# Patient Record
Sex: MALE | Race: BLACK OR AFRICAN AMERICAN | NOT HISPANIC OR LATINO | URBAN - METROPOLITAN AREA
[De-identification: names, ages, dates, MRNs, and addresses within clinical notes are randomized per-mention and may not be internally consistent; named-entity substitution may affect disease eponyms.]

---

## 2018-12-03 ENCOUNTER — AMBULATORY - HEALTHEAST (OUTPATIENT)
Dept: ADDICTION MEDICINE | Facility: HOSPITAL | Age: 33
End: 2018-12-03

## 2018-12-03 ENCOUNTER — OFFICE VISIT - HEALTHEAST (OUTPATIENT)
Dept: ADDICTION MEDICINE | Facility: HOSPITAL | Age: 33
End: 2018-12-03

## 2018-12-03 DIAGNOSIS — F15.10 MILD AMPHETAMINE USE DISORDER (H): ICD-10-CM

## 2018-12-06 ENCOUNTER — AMBULATORY - HEALTHEAST (OUTPATIENT)
Dept: ADDICTION MEDICINE | Facility: HOSPITAL | Age: 33
End: 2018-12-06

## 2018-12-10 ENCOUNTER — OFFICE VISIT - HEALTHEAST (OUTPATIENT)
Dept: ADDICTION MEDICINE | Facility: HOSPITAL | Age: 33
End: 2018-12-10

## 2018-12-10 DIAGNOSIS — F15.10 MILD AMPHETAMINE USE DISORDER (H): ICD-10-CM

## 2018-12-11 ENCOUNTER — OFFICE VISIT - HEALTHEAST (OUTPATIENT)
Dept: ADDICTION MEDICINE | Facility: HOSPITAL | Age: 33
End: 2018-12-11

## 2018-12-11 ENCOUNTER — AMBULATORY - HEALTHEAST (OUTPATIENT)
Dept: ADDICTION MEDICINE | Facility: HOSPITAL | Age: 33
End: 2018-12-11

## 2018-12-11 DIAGNOSIS — F15.10 MILD AMPHETAMINE USE DISORDER (H): ICD-10-CM

## 2018-12-12 ENCOUNTER — COMMUNICATION - HEALTHEAST (OUTPATIENT)
Dept: ADDICTION MEDICINE | Facility: HOSPITAL | Age: 33
End: 2018-12-12

## 2018-12-13 ENCOUNTER — COMMUNICATION - HEALTHEAST (OUTPATIENT)
Dept: ADDICTION MEDICINE | Facility: HOSPITAL | Age: 33
End: 2018-12-13

## 2018-12-20 ENCOUNTER — OFFICE VISIT - HEALTHEAST (OUTPATIENT)
Dept: ADDICTION MEDICINE | Facility: HOSPITAL | Age: 33
End: 2018-12-20

## 2018-12-20 ENCOUNTER — AMBULATORY - HEALTHEAST (OUTPATIENT)
Dept: ADDICTION MEDICINE | Facility: HOSPITAL | Age: 33
End: 2018-12-20

## 2018-12-20 DIAGNOSIS — F15.10 MILD AMPHETAMINE USE DISORDER (H): ICD-10-CM

## 2018-12-26 ENCOUNTER — OFFICE VISIT - HEALTHEAST (OUTPATIENT)
Dept: ADDICTION MEDICINE | Facility: HOSPITAL | Age: 33
End: 2018-12-26

## 2018-12-26 DIAGNOSIS — F15.10 MILD AMPHETAMINE USE DISORDER (H): ICD-10-CM

## 2018-12-27 ENCOUNTER — OFFICE VISIT - HEALTHEAST (OUTPATIENT)
Dept: ADDICTION MEDICINE | Facility: HOSPITAL | Age: 33
End: 2018-12-27

## 2018-12-27 ENCOUNTER — AMBULATORY - HEALTHEAST (OUTPATIENT)
Dept: ADDICTION MEDICINE | Facility: HOSPITAL | Age: 33
End: 2018-12-27

## 2018-12-27 DIAGNOSIS — F15.10 MILD AMPHETAMINE USE DISORDER (H): ICD-10-CM

## 2019-01-02 ENCOUNTER — OFFICE VISIT - HEALTHEAST (OUTPATIENT)
Dept: ADDICTION MEDICINE | Facility: HOSPITAL | Age: 34
End: 2019-01-02

## 2019-01-02 DIAGNOSIS — F15.10 MILD AMPHETAMINE USE DISORDER (H): ICD-10-CM

## 2019-01-03 ENCOUNTER — AMBULATORY - HEALTHEAST (OUTPATIENT)
Dept: LAB | Facility: HOSPITAL | Age: 34
End: 2019-01-03

## 2019-01-03 ENCOUNTER — OFFICE VISIT - HEALTHEAST (OUTPATIENT)
Dept: ADDICTION MEDICINE | Facility: HOSPITAL | Age: 34
End: 2019-01-03

## 2019-01-03 DIAGNOSIS — F15.10 MILD AMPHETAMINE USE DISORDER (H): ICD-10-CM

## 2019-01-03 LAB
AMPHETAMINES UR QL SCN: NORMAL
BARBITURATES UR QL: NORMAL
BENZODIAZ UR QL: NORMAL
CANNABINOIDS UR QL SCN: NORMAL
COCAINE UR QL: NORMAL
CREAT UR-MCNC: 20.5 MG/DL
ETHANOL UR CFM-MCNC: <10 MG/DL
METHADONE UR QL SCN: NORMAL
OPIATES UR QL SCN: NORMAL
OXYCODONE UR QL: NORMAL
PCP UR QL SCN: NORMAL

## 2019-01-04 ENCOUNTER — AMBULATORY - HEALTHEAST (OUTPATIENT)
Dept: ADDICTION MEDICINE | Facility: HOSPITAL | Age: 34
End: 2019-01-04

## 2019-01-07 ENCOUNTER — OFFICE VISIT - HEALTHEAST (OUTPATIENT)
Dept: ADDICTION MEDICINE | Facility: HOSPITAL | Age: 34
End: 2019-01-07

## 2019-01-07 DIAGNOSIS — F15.10 MILD AMPHETAMINE USE DISORDER (H): ICD-10-CM

## 2019-01-08 ENCOUNTER — OFFICE VISIT - HEALTHEAST (OUTPATIENT)
Dept: ADDICTION MEDICINE | Facility: HOSPITAL | Age: 34
End: 2019-01-08

## 2019-01-08 DIAGNOSIS — F15.10 MILD AMPHETAMINE USE DISORDER (H): ICD-10-CM

## 2019-01-09 ENCOUNTER — COMMUNICATION - HEALTHEAST (OUTPATIENT)
Dept: ADDICTION MEDICINE | Facility: HOSPITAL | Age: 34
End: 2019-01-09

## 2019-01-10 ENCOUNTER — AMBULATORY - HEALTHEAST (OUTPATIENT)
Dept: ADDICTION MEDICINE | Facility: HOSPITAL | Age: 34
End: 2019-01-10

## 2019-01-10 ENCOUNTER — COMMUNICATION - HEALTHEAST (OUTPATIENT)
Dept: ADDICTION MEDICINE | Facility: HOSPITAL | Age: 34
End: 2019-01-10

## 2019-01-14 ENCOUNTER — OFFICE VISIT - HEALTHEAST (OUTPATIENT)
Dept: ADDICTION MEDICINE | Facility: HOSPITAL | Age: 34
End: 2019-01-14

## 2019-01-14 DIAGNOSIS — F15.10 MILD AMPHETAMINE USE DISORDER (H): ICD-10-CM

## 2019-01-15 ENCOUNTER — OFFICE VISIT - HEALTHEAST (OUTPATIENT)
Dept: ADDICTION MEDICINE | Facility: HOSPITAL | Age: 34
End: 2019-01-15

## 2019-01-15 DIAGNOSIS — F15.10 MILD AMPHETAMINE USE DISORDER (H): ICD-10-CM

## 2019-01-16 ENCOUNTER — OFFICE VISIT - HEALTHEAST (OUTPATIENT)
Dept: ADDICTION MEDICINE | Facility: HOSPITAL | Age: 34
End: 2019-01-16

## 2019-01-16 DIAGNOSIS — F15.10 MILD AMPHETAMINE USE DISORDER (H): ICD-10-CM

## 2019-01-17 ENCOUNTER — OFFICE VISIT - HEALTHEAST (OUTPATIENT)
Dept: ADDICTION MEDICINE | Facility: HOSPITAL | Age: 34
End: 2019-01-17

## 2019-01-17 ENCOUNTER — AMBULATORY - HEALTHEAST (OUTPATIENT)
Dept: LAB | Facility: HOSPITAL | Age: 34
End: 2019-01-17

## 2019-01-17 ENCOUNTER — AMBULATORY - HEALTHEAST (OUTPATIENT)
Dept: ADDICTION MEDICINE | Facility: HOSPITAL | Age: 34
End: 2019-01-17

## 2019-01-17 DIAGNOSIS — F15.10 MILD AMPHETAMINE USE DISORDER (H): ICD-10-CM

## 2019-01-17 LAB
AMPHETAMINES UR QL SCN: NORMAL
BARBITURATES UR QL: NORMAL
BENZODIAZ UR QL: NORMAL
CANNABINOIDS UR QL SCN: NORMAL
COCAINE UR QL: NORMAL
CREAT UR-MCNC: 21.6 MG/DL
ETHANOL UR CFM-MCNC: <10 MG/DL
METHADONE UR QL SCN: NORMAL
OPIATES UR QL SCN: NORMAL
OXYCODONE UR QL: NORMAL
PCP UR QL SCN: NORMAL

## 2019-01-21 ENCOUNTER — OFFICE VISIT - HEALTHEAST (OUTPATIENT)
Dept: ADDICTION MEDICINE | Facility: HOSPITAL | Age: 34
End: 2019-01-21

## 2019-01-21 DIAGNOSIS — F15.10 MILD AMPHETAMINE USE DISORDER (H): ICD-10-CM

## 2019-01-22 ENCOUNTER — AMBULATORY - HEALTHEAST (OUTPATIENT)
Dept: ADDICTION MEDICINE | Facility: HOSPITAL | Age: 34
End: 2019-01-22

## 2019-01-22 ENCOUNTER — COMMUNICATION - HEALTHEAST (OUTPATIENT)
Dept: ADDICTION MEDICINE | Facility: HOSPITAL | Age: 34
End: 2019-01-22

## 2019-01-22 ENCOUNTER — OFFICE VISIT - HEALTHEAST (OUTPATIENT)
Dept: ADDICTION MEDICINE | Facility: HOSPITAL | Age: 34
End: 2019-01-22

## 2019-01-22 DIAGNOSIS — F15.10 MILD AMPHETAMINE USE DISORDER (H): ICD-10-CM

## 2019-01-23 ENCOUNTER — OFFICE VISIT - HEALTHEAST (OUTPATIENT)
Dept: ADDICTION MEDICINE | Facility: HOSPITAL | Age: 34
End: 2019-01-23

## 2019-01-23 DIAGNOSIS — F15.10 MILD AMPHETAMINE USE DISORDER (H): ICD-10-CM

## 2019-01-28 ENCOUNTER — OFFICE VISIT - HEALTHEAST (OUTPATIENT)
Dept: ADDICTION MEDICINE | Facility: HOSPITAL | Age: 34
End: 2019-01-28

## 2019-01-28 DIAGNOSIS — F15.10 MILD AMPHETAMINE USE DISORDER (H): ICD-10-CM

## 2019-01-29 ENCOUNTER — OFFICE VISIT - HEALTHEAST (OUTPATIENT)
Dept: ADDICTION MEDICINE | Facility: HOSPITAL | Age: 34
End: 2019-01-29

## 2019-01-29 DIAGNOSIS — F15.10 MILD AMPHETAMINE USE DISORDER (H): ICD-10-CM

## 2019-01-30 ENCOUNTER — AMBULATORY - HEALTHEAST (OUTPATIENT)
Dept: ADDICTION MEDICINE | Facility: HOSPITAL | Age: 34
End: 2019-01-30

## 2019-01-30 ENCOUNTER — AMBULATORY - HEALTHEAST (OUTPATIENT)
Dept: LAB | Facility: HOSPITAL | Age: 34
End: 2019-01-30

## 2019-01-30 DIAGNOSIS — F15.10 MILD AMPHETAMINE USE DISORDER (H): ICD-10-CM

## 2019-01-30 LAB
AMPHETAMINES UR QL SCN: NORMAL
BARBITURATES UR QL: NORMAL
BENZODIAZ UR QL: NORMAL
CANNABINOIDS UR QL SCN: NORMAL
COCAINE UR QL: NORMAL
CREAT UR-MCNC: 18.4 MG/DL
ETHANOL UR CFM-MCNC: <10 MG/DL
METHADONE UR QL SCN: NORMAL
OPIATES UR QL SCN: NORMAL
OXYCODONE UR QL: NORMAL
PCP UR QL SCN: NORMAL

## 2019-02-04 ENCOUNTER — OFFICE VISIT - HEALTHEAST (OUTPATIENT)
Dept: ADDICTION MEDICINE | Facility: HOSPITAL | Age: 34
End: 2019-02-04

## 2019-02-04 DIAGNOSIS — F15.10 MILD AMPHETAMINE USE DISORDER (H): ICD-10-CM

## 2019-02-05 ENCOUNTER — OFFICE VISIT - HEALTHEAST (OUTPATIENT)
Dept: ADDICTION MEDICINE | Facility: HOSPITAL | Age: 34
End: 2019-02-05

## 2019-02-05 DIAGNOSIS — F15.10 MILD AMPHETAMINE USE DISORDER (H): ICD-10-CM

## 2019-02-06 ENCOUNTER — AMBULATORY - HEALTHEAST (OUTPATIENT)
Dept: LAB | Facility: HOSPITAL | Age: 34
End: 2019-02-06

## 2019-02-06 ENCOUNTER — AMBULATORY - HEALTHEAST (OUTPATIENT)
Dept: ADDICTION MEDICINE | Facility: HOSPITAL | Age: 34
End: 2019-02-06

## 2019-02-06 DIAGNOSIS — F15.10 MILD AMPHETAMINE USE DISORDER (H): ICD-10-CM

## 2019-02-11 ENCOUNTER — COMMUNICATION - HEALTHEAST (OUTPATIENT)
Dept: ADDICTION MEDICINE | Facility: HOSPITAL | Age: 34
End: 2019-02-11

## 2019-02-11 ENCOUNTER — OFFICE VISIT - HEALTHEAST (OUTPATIENT)
Dept: ADDICTION MEDICINE | Facility: HOSPITAL | Age: 34
End: 2019-02-11

## 2019-02-11 DIAGNOSIS — F15.10 MILD AMPHETAMINE USE DISORDER (H): ICD-10-CM

## 2019-02-12 ENCOUNTER — OFFICE VISIT - HEALTHEAST (OUTPATIENT)
Dept: ADDICTION MEDICINE | Facility: HOSPITAL | Age: 34
End: 2019-02-12

## 2019-02-12 DIAGNOSIS — F15.10 MILD AMPHETAMINE USE DISORDER (H): ICD-10-CM

## 2019-02-13 ENCOUNTER — AMBULATORY - HEALTHEAST (OUTPATIENT)
Dept: ADDICTION MEDICINE | Facility: HOSPITAL | Age: 34
End: 2019-02-13

## 2019-02-18 ENCOUNTER — OFFICE VISIT - HEALTHEAST (OUTPATIENT)
Dept: ADDICTION MEDICINE | Facility: HOSPITAL | Age: 34
End: 2019-02-18

## 2019-02-18 DIAGNOSIS — F15.10 MILD AMPHETAMINE USE DISORDER (H): ICD-10-CM

## 2019-02-21 ENCOUNTER — AMBULATORY - HEALTHEAST (OUTPATIENT)
Dept: ADDICTION MEDICINE | Facility: HOSPITAL | Age: 34
End: 2019-02-21

## 2019-02-26 ENCOUNTER — OFFICE VISIT - HEALTHEAST (OUTPATIENT)
Dept: ADDICTION MEDICINE | Facility: HOSPITAL | Age: 34
End: 2019-02-26

## 2019-02-26 DIAGNOSIS — F15.10 MILD AMPHETAMINE USE DISORDER (H): ICD-10-CM

## 2019-02-28 ENCOUNTER — AMBULATORY - HEALTHEAST (OUTPATIENT)
Dept: ADDICTION MEDICINE | Facility: HOSPITAL | Age: 34
End: 2019-02-28

## 2019-02-28 ENCOUNTER — OFFICE VISIT - HEALTHEAST (OUTPATIENT)
Dept: ADDICTION MEDICINE | Facility: HOSPITAL | Age: 34
End: 2019-02-28

## 2019-02-28 DIAGNOSIS — F15.10 MILD AMPHETAMINE USE DISORDER (H): ICD-10-CM

## 2019-03-04 ENCOUNTER — OFFICE VISIT - HEALTHEAST (OUTPATIENT)
Dept: ADDICTION MEDICINE | Facility: HOSPITAL | Age: 34
End: 2019-03-04

## 2019-03-04 DIAGNOSIS — F15.10 MILD AMPHETAMINE USE DISORDER (H): ICD-10-CM

## 2019-03-05 ENCOUNTER — OFFICE VISIT - HEALTHEAST (OUTPATIENT)
Dept: ADDICTION MEDICINE | Facility: HOSPITAL | Age: 34
End: 2019-03-05

## 2019-03-05 ENCOUNTER — AMBULATORY - HEALTHEAST (OUTPATIENT)
Dept: ADDICTION MEDICINE | Facility: HOSPITAL | Age: 34
End: 2019-03-05

## 2019-03-05 DIAGNOSIS — F15.10 MILD AMPHETAMINE USE DISORDER (H): ICD-10-CM

## 2019-03-06 ENCOUNTER — OFFICE VISIT - HEALTHEAST (OUTPATIENT)
Dept: ADDICTION MEDICINE | Facility: HOSPITAL | Age: 34
End: 2019-03-06

## 2019-03-06 DIAGNOSIS — F15.10 MILD AMPHETAMINE USE DISORDER (H): ICD-10-CM

## 2019-03-07 ENCOUNTER — COMMUNICATION - HEALTHEAST (OUTPATIENT)
Dept: ADDICTION MEDICINE | Facility: HOSPITAL | Age: 34
End: 2019-03-07

## 2019-03-11 ENCOUNTER — OFFICE VISIT - HEALTHEAST (OUTPATIENT)
Dept: ADDICTION MEDICINE | Facility: HOSPITAL | Age: 34
End: 2019-03-11

## 2019-03-11 ENCOUNTER — COMMUNICATION - HEALTHEAST (OUTPATIENT)
Dept: ADDICTION MEDICINE | Facility: HOSPITAL | Age: 34
End: 2019-03-11

## 2019-03-11 DIAGNOSIS — F15.10 MILD AMPHETAMINE USE DISORDER (H): ICD-10-CM

## 2019-03-12 ENCOUNTER — AMBULATORY - HEALTHEAST (OUTPATIENT)
Dept: LAB | Facility: HOSPITAL | Age: 34
End: 2019-03-12

## 2019-03-12 ENCOUNTER — COMMUNICATION - HEALTHEAST (OUTPATIENT)
Dept: ADDICTION MEDICINE | Facility: HOSPITAL | Age: 34
End: 2019-03-12

## 2019-03-12 DIAGNOSIS — F15.10 MILD AMPHETAMINE USE DISORDER (H): ICD-10-CM

## 2019-03-12 LAB
AMPHETAMINES UR QL SCN: NORMAL
BARBITURATES UR QL: NORMAL
BENZODIAZ UR QL: NORMAL
CANNABINOIDS UR QL SCN: NORMAL
COCAINE UR QL: NORMAL
CREAT UR-MCNC: 25.6 MG/DL
ETHANOL UR CFM-MCNC: <10 MG/DL
METHADONE UR QL SCN: NORMAL
OPIATES UR QL SCN: NORMAL
OXYCODONE UR QL: NORMAL
PCP UR QL SCN: NORMAL

## 2019-03-13 ENCOUNTER — OFFICE VISIT - HEALTHEAST (OUTPATIENT)
Dept: ADDICTION MEDICINE | Facility: HOSPITAL | Age: 34
End: 2019-03-13

## 2019-03-13 DIAGNOSIS — F15.10 MILD AMPHETAMINE USE DISORDER (H): ICD-10-CM

## 2019-03-14 ENCOUNTER — AMBULATORY - HEALTHEAST (OUTPATIENT)
Dept: ADDICTION MEDICINE | Facility: HOSPITAL | Age: 34
End: 2019-03-14

## 2019-03-14 ENCOUNTER — COMMUNICATION - HEALTHEAST (OUTPATIENT)
Dept: ADDICTION MEDICINE | Facility: HOSPITAL | Age: 34
End: 2019-03-14

## 2019-03-18 ENCOUNTER — OFFICE VISIT - HEALTHEAST (OUTPATIENT)
Dept: ADDICTION MEDICINE | Facility: HOSPITAL | Age: 34
End: 2019-03-18

## 2019-03-18 ENCOUNTER — COMMUNICATION - HEALTHEAST (OUTPATIENT)
Dept: ADDICTION MEDICINE | Facility: HOSPITAL | Age: 34
End: 2019-03-18

## 2019-03-18 DIAGNOSIS — F15.10 MILD AMPHETAMINE USE DISORDER (H): ICD-10-CM

## 2019-03-20 ENCOUNTER — OFFICE VISIT - HEALTHEAST (OUTPATIENT)
Dept: ADDICTION MEDICINE | Facility: HOSPITAL | Age: 34
End: 2019-03-20

## 2019-03-20 DIAGNOSIS — F15.10 MILD AMPHETAMINE USE DISORDER (H): ICD-10-CM

## 2019-03-21 ENCOUNTER — AMBULATORY - HEALTHEAST (OUTPATIENT)
Dept: LAB | Facility: HOSPITAL | Age: 34
End: 2019-03-21

## 2019-03-21 ENCOUNTER — AMBULATORY - HEALTHEAST (OUTPATIENT)
Dept: ADDICTION MEDICINE | Facility: HOSPITAL | Age: 34
End: 2019-03-21

## 2019-03-21 DIAGNOSIS — F15.10 MILD AMPHETAMINE USE DISORDER (H): ICD-10-CM

## 2019-03-21 LAB
AMPHETAMINES UR QL SCN: NORMAL
BARBITURATES UR QL: NORMAL
BENZODIAZ UR QL: NORMAL
CANNABINOIDS UR QL SCN: NORMAL
COCAINE UR QL: NORMAL
CREAT UR-MCNC: 69 MG/DL
ETHANOL UR CFM-MCNC: <10 MG/DL
METHADONE UR QL SCN: NORMAL
OPIATES UR QL SCN: NORMAL
OXYCODONE UR QL: NORMAL
PCP UR QL SCN: NORMAL

## 2019-04-01 ENCOUNTER — OFFICE VISIT - HEALTHEAST (OUTPATIENT)
Dept: ADDICTION MEDICINE | Facility: HOSPITAL | Age: 34
End: 2019-04-01

## 2019-04-01 DIAGNOSIS — F15.10 MILD AMPHETAMINE USE DISORDER (H): ICD-10-CM

## 2019-04-05 ENCOUNTER — AMBULATORY - HEALTHEAST (OUTPATIENT)
Dept: ADDICTION MEDICINE | Facility: HOSPITAL | Age: 34
End: 2019-04-05

## 2019-04-09 ENCOUNTER — OFFICE VISIT - HEALTHEAST (OUTPATIENT)
Dept: ADDICTION MEDICINE | Facility: HOSPITAL | Age: 34
End: 2019-04-09

## 2019-04-09 DIAGNOSIS — F15.10 MILD AMPHETAMINE USE DISORDER (H): ICD-10-CM

## 2019-04-11 ENCOUNTER — AMBULATORY - HEALTHEAST (OUTPATIENT)
Dept: ADDICTION MEDICINE | Facility: HOSPITAL | Age: 34
End: 2019-04-11

## 2019-04-17 ENCOUNTER — COMMUNICATION - HEALTHEAST (OUTPATIENT)
Dept: ADDICTION MEDICINE | Facility: HOSPITAL | Age: 34
End: 2019-04-17

## 2019-04-22 ENCOUNTER — OFFICE VISIT - HEALTHEAST (OUTPATIENT)
Dept: ADDICTION MEDICINE | Facility: HOSPITAL | Age: 34
End: 2019-04-22

## 2019-04-22 DIAGNOSIS — F15.10 MILD AMPHETAMINE USE DISORDER (H): ICD-10-CM

## 2019-04-24 ENCOUNTER — AMBULATORY - HEALTHEAST (OUTPATIENT)
Dept: ADDICTION MEDICINE | Facility: HOSPITAL | Age: 34
End: 2019-04-24

## 2019-04-24 ENCOUNTER — OFFICE VISIT - HEALTHEAST (OUTPATIENT)
Dept: ADDICTION MEDICINE | Facility: HOSPITAL | Age: 34
End: 2019-04-24

## 2019-04-24 DIAGNOSIS — F15.10 MILD AMPHETAMINE USE DISORDER (H): ICD-10-CM

## 2019-04-30 ENCOUNTER — COMMUNICATION - HEALTHEAST (OUTPATIENT)
Dept: ADDICTION MEDICINE | Facility: HOSPITAL | Age: 34
End: 2019-04-30

## 2019-05-02 ENCOUNTER — OFFICE VISIT - HEALTHEAST (OUTPATIENT)
Dept: ADDICTION MEDICINE | Facility: HOSPITAL | Age: 34
End: 2019-05-02

## 2019-05-02 ENCOUNTER — COMMUNICATION - HEALTHEAST (OUTPATIENT)
Dept: ADDICTION MEDICINE | Facility: HOSPITAL | Age: 34
End: 2019-05-02

## 2019-05-02 DIAGNOSIS — F15.10 MILD AMPHETAMINE USE DISORDER (H): ICD-10-CM

## 2019-05-03 ENCOUNTER — AMBULATORY - HEALTHEAST (OUTPATIENT)
Dept: ADDICTION MEDICINE | Facility: HOSPITAL | Age: 34
End: 2019-05-03

## 2019-05-06 ENCOUNTER — COMMUNICATION - HEALTHEAST (OUTPATIENT)
Dept: ADDICTION MEDICINE | Facility: HOSPITAL | Age: 34
End: 2019-05-06

## 2019-05-07 ENCOUNTER — COMMUNICATION - HEALTHEAST (OUTPATIENT)
Dept: ADDICTION MEDICINE | Facility: HOSPITAL | Age: 34
End: 2019-05-07

## 2019-05-13 ENCOUNTER — OFFICE VISIT - HEALTHEAST (OUTPATIENT)
Dept: ADDICTION MEDICINE | Facility: HOSPITAL | Age: 34
End: 2019-05-13

## 2019-05-13 DIAGNOSIS — F15.10 MILD AMPHETAMINE USE DISORDER (H): ICD-10-CM

## 2019-05-16 ENCOUNTER — AMBULATORY - HEALTHEAST (OUTPATIENT)
Dept: ADDICTION MEDICINE | Facility: HOSPITAL | Age: 34
End: 2019-05-16

## 2019-05-16 ENCOUNTER — OFFICE VISIT - HEALTHEAST (OUTPATIENT)
Dept: ADDICTION MEDICINE | Facility: HOSPITAL | Age: 34
End: 2019-05-16

## 2019-05-16 DIAGNOSIS — F15.10 MILD AMPHETAMINE USE DISORDER (H): ICD-10-CM

## 2019-05-22 ENCOUNTER — COMMUNICATION - HEALTHEAST (OUTPATIENT)
Dept: ADDICTION MEDICINE | Facility: HOSPITAL | Age: 34
End: 2019-05-22

## 2019-05-23 ENCOUNTER — OFFICE VISIT - HEALTHEAST (OUTPATIENT)
Dept: ADDICTION MEDICINE | Facility: HOSPITAL | Age: 34
End: 2019-05-23

## 2019-05-23 DIAGNOSIS — F15.10 MILD AMPHETAMINE USE DISORDER (H): ICD-10-CM

## 2019-05-29 ENCOUNTER — AMBULATORY - HEALTHEAST (OUTPATIENT)
Dept: ADDICTION MEDICINE | Facility: HOSPITAL | Age: 34
End: 2019-05-29

## 2021-05-27 NOTE — TELEPHONE ENCOUNTER
Phone Call:    D) Call was made this date to Pt., re: health and attendance. Pt. Reports he went to the hospital the same date of his last group attendance to address the health issues he was experiencing. Pt. Reports he was informed that his symptoms were the results of anxiety attacks and from smoking. He reports no further immediate issues.    Pt. Requested to be excused trough the rest of this week with the plan to return on Monday 4/22/19. Counselor requested that the Pt. Avoid late group arrivals moving forward and plan to arrive at the group start time of 6pm. Pt. Agreed.     Staff Name and Title:   KIMBERLY Alegre, [facility]  4/17/2019, 1:10 PM

## 2021-05-27 NOTE — PROGRESS NOTES
Daily Group Note:    Micheal CELESTE JavierRonquillo attended 2 hours of group therapy today. T) Distorted Thinking.     Total group size of 9.    Pt. Reports relapsing with meth on 3/27/19 and continuing use through 3/29/19. Pt. Denies any further or other use.  Pt. Processed openly in group. He reports having contacted and informing his P.O. about his use.  No reported immediate needs.     Late entry note for this date: 4/1/19    Staff Name and Title: KIMBERLY Mason, [facility]    Date:  4/2/2019  Time:  2:46 PM

## 2021-05-27 NOTE — TELEPHONE ENCOUNTER
Phone Call:    D) P.O. Sharon Benson called in this date re: probation update on Pt.    P.O. Reports that she last met with Pt. This week on 4/15/19 and requested a UA from him that date that came back positive for meth. P.O. Also reports he has missed several other requested UAs. P.O. Has concern for continued use issues and possible need for higher level of care if use continues. She also reports that further missed UAs will result in a probation violation.       Staff Name and Title:   KIMBERLY Alegre, [facility]  4/17/2019, 2:10 PM

## 2021-05-27 NOTE — PROGRESS NOTES
Weekly Progress Note  Micheal Ronquillo  1985  [id number]      D) Pt. attended 1 groups this week with 1 absences. Pt. attended 0 individual sessions this week. A) Staff facilitated groups and reviewed tx progress. Assessed for [facility]. R) No VAP needed at this time.    Any significant events, defines as events that impact patient s relationship with others inside and outside of treatment: No.   Indicate any changes or monitoring of physical or mental health problems: No.    Indicate involvement by any outside supports: No.   IAPP reviewed and modified as needed: N/A       Pt. working on the following dimensions:    Dimension #1 - Withdrawal Potential - Risk 0.     Specific goals from treatment plan addressed this week:  No goals needed, risk level 0.  Effectiveness of strategies:  Last reported use, meth 3/29/19. Pt. Libby withdrawal issues/concerns.    Dimension #2 - Biomedical - Risk 0.     Specific goals from treatment plan addressed this week:  No goals needed, risk level 0.  Effectiveness of strategies:  No reported change in medical/health status.    Dimension #3 - Emotional/Behavioral/Cognitive - Risk 2.     Specific goals from treatment plan addressed this week:  Process difficult moods in a healthy way.   Obtain medical insurance coverage.  Effectiveness of strategies:  No medical coverage in place. Mood appears healthy, but stressed and dissapointed. Pt. Reports feeling disapointment in himself for using. He processed in group how to look for his strengthens and recognize where the hope is in the situation.    Dimension #4 - Treatment Acceptance/Resistance - Risk 2.    Specific goals from treatment plan addressed this week:  Meet the expectation of probation to complete treatment.  Effectiveness of strategies:  Pt. Attended group 1x this week. He is in phase two of the program. Pt. Reports continued transportation barriers that cause him to arrive late to group, attending 2 out of 3 hours of group on  average.     Dimension #5 - Relapse Potential - Risk 2.     Specific goals from treatment plan addressed this week:  Process past use patterns to build insight.   Build knowledge and understanding of relapse prevention skills.   Effectiveness of strategies:  Pt. Reports using meth this week. Pt. Reports he recognizes that he hung on to connections with using peers as a convenience to use when he wants, not surrendering those connections in support of sobriety. When asked by counselor and group peers what changes he is ready to make, he is unable to answer or commit to suggestions.     Dimension #6 - Recovery Environment - Risk 2.     Specific goals from treatment plan addressed this week:  Strengthen recovery support.   Effectiveness of strategies:  He reports minimal involvement with his children, but being back in the home of his children's mother. He reports this week  After using he went to stay at a friend house. Pt. Reports he lacks stable housing. Pt. Reports no progress with seeking [facility] sob housing.     T) Treatment plan updated: No.  Patient notified and in agreement: N/A.  Patient educated on: Distorted Thinking. Patient has completed 64 of 84 program hours at this time. Projected discharge date is: TBD. Current discharge plan is: PENDING.       Staff Name and Title:   KIMBERLY Mason, [facility]  Date: 4/5/2019  Time: 8:27 AM    Psycho-Educational Curriculum  Educational Videos  Date (s) Attended    Nature of Addiction         1/7/19,  1/8/19,  3/18/19,  3/20/19,     Co-Occurring Disorders          1/14/19,  1/15/19,  1/16/19,  1/17/19,   Distorted Thinking         1/21/19,  1/22/19,  1/23/19,  4/1/19,   Community Support           1/28/19,  1/29/19,     Communication Skills          1/4/19,  1/5/19,     Unmanageable Feelings       Happy  2/11/19,  2/12/19,   Relapse Prevention          2/18/19,   Relationships          12/10/18,  12/11/18,  2/26/19,  2/28/19,   Distorted Self Image          12/20/18,  3/4/19,  3/5/19,  3/6/19,   Recovery Maintenance          12/26/18,  12/27/18,  1/2/19,  1/3/19,  3/11/19,  3/13/19,                                                   Mandatory Education:       HIV/AIDS

## 2021-05-28 NOTE — TELEPHONE ENCOUNTER
Phone Call:    D) Call was made this date to Pt., re: attendance concerns and probation update.    Pt. Reports he met with probation 5/1/19 and he is being required to get a Rule 25 through the county on 5[place]19. Pt. Reports he plans to attend group this date.    Staff Name and Title:   KIMBERLY Alegre, [facility]  5/2/2019, 4:13 PM

## 2021-05-28 NOTE — TELEPHONE ENCOUNTER
Phone Call:    D) P.O Sharon Benson called this date and left message re: update on probation status. P.O. Reports last meeting with the Pt. on 5/1/19 and at the meeting he signed paperwork admitting to using meth on a 2x weekly average. P.O. Also reports that the Pt. Is scheduled for a Rule 25 assessment for this date at 1pm. Call back was made this date, due to no awnser a message left confirming that re-assessment for higher level of care is appropriate and informing P.O. That Pt. Is scheduled to remain in the EOP program and attend 2x weekly until a placement from his re-assessment is identified.    Staff Name and Title:   KIMBERLY Alegre, [facility] [place]  5/6/2019, 1:40 PM

## 2021-05-28 NOTE — TELEPHONE ENCOUNTER
Phone Call:    D) Call was made to Pt. this date and message left re: attendance concerns, request of assessment appointment update, and requesting call back.    Staff Name and Title:   KIMBERLY Alegre, [facility]  5/7/2019, 2:30 PM

## 2021-05-28 NOTE — TELEPHONE ENCOUNTER
Phone Call:    D) Pt. called in this date re: running late, will arrive late to group.  Counselor asked Pt. To arrive ASAP.    Staff Name and Title:   KIMBERLY Alegre, [facility]  5/2/2019, 5:44 PM

## 2021-05-28 NOTE — TELEPHONE ENCOUNTER
Phone Call:    D) P.O. Sharon Benson called on 4/29/19 and left message re: probation issues with Pt. She reports that the Pt. Has missed requested UAs and his last UA on 4/15/19 came back positive for meth. P.O. Also reports plans to violate Pt. With probation unless he goes to a higher level of care to address continued use issues.    Call back was made this date, due to no answer a message was left confirming agreement with probations recommendations and reporting follow up with Pt. On his plan to seek a new Rule 25 assessment through the [facility] and discharge for the EOP program.      Staff Name and Title:   KIMBERLY Alegre, [facility]  4/30/2019, 4:52 PM

## 2021-05-28 NOTE — TELEPHONE ENCOUNTER
Phone Call:    D) Pt. called this date and left message re: request call back. Call back was made this date, Pt. confirms the concerns of probation. He reports plans to seek a Rule 25 assessment through the county..      Staff Name and Title:   KIMBERLY Alegre, [facility]  4/30/2019, 4:59 PM

## 2021-05-28 NOTE — TELEPHONE ENCOUNTER
Phone Call:    D) Call was made to AMINA Antonio this date and message left re: request for probation update, and requesting call back.      Staff Name and Title:   KIMBERLY Alegre, [facility]  5/2/2019, 4:08 PM

## 2021-05-29 NOTE — TELEPHONE ENCOUNTER
Phone Call:    D) Call was made to Pt.  this date and message left re: requesting update on Rule 25 placement progress, D/C plans from [facility], and requesting call back.    Staff Name and Title:   KIMBERLY Alegre, [facility]  5/22/2019, 5:03 PM

## 2021-06-02 ENCOUNTER — RECORDS - HEALTHEAST (OUTPATIENT)
Dept: ADMINISTRATIVE | Facility: CLINIC | Age: 36
End: 2021-06-02

## 2021-06-22 NOTE — PROGRESS NOTES
Daily Group Note:    Micheal Ronquillo attended 3 hours of group therapy today. T) Distorted Self-Image.     Total group size of 3.    Pt. Reports maintaining sobriety.  No report of immediate needs.     Staff Name and Title: KIMBERLY Mason, [facility]    Date:  12/20/2018  Time:  8:38 PM

## 2021-06-22 NOTE — PROGRESS NOTES
Daily Group Note:    Micheal Ronquillo attended 2 hours of group therapy today. T) Nature of Addiction.     Total group size of 5.    Pt. Reports maintaining sobriety.  No report of immediate needs.     Staff Name and Title: KIMEBRLY Mason, [facility]    Date:  1/8/2019  Time:  8:30 PM

## 2021-06-22 NOTE — PROGRESS NOTES
Addiction Services - Initial Services Plan     Patient  Name: Micheal Ronquillo  MRN: [id number]   : 1985  Admit Date: 12/3/18       Patient describes their immediate need: To learn skills to prevent relapse.     Are there any immediate Safety Needs such as (physical, stability, mobility): Pt. denies any immediate safety needs or concerns.     Immediate Health Needs and Plan: No immediate health needs reported. Pt. will obtain medical care as needed.    Vulnerable Adult: No     Issues to be addressed in the first sessions:   Opening up to returning to the treatment process.    Patient strengths and needs:   Strengths identified as: caring and involved father.   Needs identified as: relapse prevention.    Plan for patient for time between intake and completion of the treatment plan:   Attend all group therapy sessions as directed, complete all written and oral assignments as directed, and remain clean and sober. A relapse, if any, must be reported to staff immediately in order to ensure you are receiving the proper level of care. If you cannot attend a group therapy session you must call contact information provided in intake folder and leave a message before or during group hours.       Vulnerable Adult Review   [X] Review of the Facility Abuse Prevention plan was reviewed with the patient   [X] No Individual Abuse Plan is necessary   [ ] In addition to the Facility Abuse Prevention plan, an Individual Abuse Plan will be put in place       Staff Name/Title: KIMBERLY Mason, [facility]  Date: 12/3/2018  Time: 4:56 PM

## 2021-06-22 NOTE — PROGRESS NOTES
Daily Group Note:    Micheal Ronquillo attended 2 hours of group therapy today, he arrived late. T) Recovery Maintenance.     Total group size of 4.    Pt. Reports maintaining sobriety.  No report of immediate needs.     Staff Name and Title: KIMBERLY Mason, [facility]    Date:  12/26/2018  Time:  8:10 PM

## 2021-06-22 NOTE — PROGRESS NOTES
Weekly Progress Note  Micheal Ronquillo  1985  [id number]      D) Pt. attended 0 groups this week with 0 absences. Pt. attended 1 individual intake session this week. A) Staff facilitated groups and reviewed tx progress. Assessed for [facility]. R) No VAP needed at this time.    Any significant events, defines as events that impact patient s relationship with others inside and outside of treatment: No.  Indicate any changes or monitoring of physical or mental health problems: No.    Indicate involvement by any outside supports: No.  IAPP reviewed and modified as needed: N/A       Pt. working on the following dimensions:    Dimension #1 - Withdrawal Potential - Risk 0.     Specific goals from treatment plan addressed this week:  No goals needed, risk level 0.  Effectiveness of strategies:  Last reported use, meth 11/22/18.    Dimension #2 - Biomedical - Risk 0.     Specific goals from treatment plan addressed this week:  No goals needed, risk level 0.  Effectiveness of strategies:  No reported change in medical/health status.    Dimension #3 - Emotional/Behavioral/Cognitive - Risk 2.     Specific goals from treatment plan addressed this week:  Maintain stable MH throughout treatment.  Effectiveness of strategies:  No reported change in MH status.    Dimension #4 - Treatment Acceptance/Resistance - Risk 2.    Specific goals from treatment plan addressed this week:  Begin and attend treatment as scheduled.  Effectiveness of strategies:  Pt. Attended the intake into the EOP program this week.    Dimension #5 - Relapse Potential - Risk 2.     Specific goals from treatment plan addressed this week:  Maintain sobriety throughout treatment.  Effectiveness of strategies:  No reported use.    Dimension #6 - Recovery Environment - Risk 2.     Specific goals from treatment plan addressed this week:  Build recovery support.   Effectiveness of strategies:  No reported change in environment or support status.    T) Treatment plan  updated: No.  Patient notified and in agreement: N/A.  Patient educated on: Intake. Patient has completed 0 of 84 program hours at this time. Projected discharge date is: TBD. Current discharge plan is: PENDING.     Staff Name and Title:   KIMBERLY Alegre, [facility]  12/3/2018, 5:15 PM      Psycho-Educational Curriculum  Educational Videos  Date (s) Attended    Nature of Addiction            Co-Occurring Disorders             Distorted Thinking            Community Support              Communication Skills             Unmanageable Feelings             Relapse Prevention             Relationships             Distorted Self Image            Recovery Maintenance                                                             Mandatory Education:       HIV/AIDS

## 2021-06-22 NOTE — PROGRESS NOTES
Daily Group Note:    Micheal CELESTE JavierRonquillo attended 3 hours of group therapy today. T) Recovery Maintenance.     Total group size of 5.    Staff Name and Title: KIMBERLY Mason, [facility]    Date:  1/2/2019  Time:  8:49 PM

## 2021-06-22 NOTE — PROGRESS NOTES
[facility] Assessment Summary    Date: 12/3/2018        : KIMBERLY Alegre, [facility]    Name: Micheal Ronquillo  Address: [address] [place] [id number]    Phone: [phone] (home)   Referral source: Probation  : 1985  Age: 33 y.o.  Race/Ethnicity: Two or More Races  Marital status: single  Employment: No Data Recorded  Level of education: 12 th grade.  Socio-economic (yearly income) status: 20,000  Sexual orientation: male  Last 4 digits of social security: [id number]    Is assistance required in the ability to read and understand written material?   No    Reason for seeking services:    Pt. reports he was referred to get a Rule 25 assessment by probation.     Dimension I Acute Intoxication/Withdrawal Potential:    Symptomology (past 12 months, check all that apply)  repeated social problems.  Observed or reported (withdrawal symptoms, check all that apply)  None reported or observed.    Chemical use most recent 12 months outside a facility and other significant use history (client self-report)  Primary Drug Used  Age of First Use  Most Recent Pattern of Use and Duration    Date of last use  Time if substance use in the last 30 days Withdrawal Potential? Requiring special care  Method of use   (oral, smoked, snort, IV, etc)    Alcohol          Marijuana/Hashish          Cocaine/Crack          Meth/Amphetamines  18 Average of 1x monthly use in last year, unsure of amounts used, would use for a span of 2-3 day each use.  18 N/A No Smoke, Snort   Heroin          Other Opiates/Synthetics          Inhalants          Benzodiazepines          Hallucinogens          Barbiturates/Sedatives/Hypnotics          Over-the-Counter Drugs          Other          Nicotine            Dimension I Risk Ratin  Reason Risk Rating Assigned: Last reported use, meth 18. No report of withdrawal issues/concerns, none observed.        Dimension II Biomedical Conditions:    Any known health conditions:  No    Ever previously treated/diagnosed with any eating disorder?  No     List health concerns/conditions reported: None reported.  Does patient indicate awareness of any association between substance use and listed health concerns/conditions? N/A  Physical/Health Conditions which are associated with substance use: N/A    Are health concerns/conditions being treated? N/A  By whom? N/A    Patient Self-Reported Medications:  Medication Dosage Frequency   None reported.                                                              Are you pregnant: N/A OB care received: N/A CPS call needed: N/A    Dimension II Risk Ratin  Reason Risk Rating Assigned: No reported medical or health issues/concerns.     Dimension III Emotional/Behavioral/Cognitive:    Oriented to person, place, time, situation? Yes  Currently attending mental health services: No  Past hospitalization for MH or psychiatric problems: No  How many hospitalizations: N/A   Last hospitalization; date and location: N/A    Past or current issues with gambling: No  Prior treatment for gambling: No  MH diagnoses:  Pt. reports PTSD, anxiety, and depression.   Psychiatrist: None     Clinic: N/A   Current psychotropic medications:  None  Taking medications as prescribed N/A  Are medications helpful: N/A  Current Suicidal Ideation: No    If yes, any plan? N/A  What is plan?: N/A  Previous suicide attempts? No  Current Homicidal Ideation: No If yes, any plan? N/A What is plan?: N/A  Previous homicide attempts? No  Suicidal/Homicidal ideation in last 30 days? No Explain: N/A  Hazardous behavior engaged in which placed self or others in danger (i.e., operating a motor vehicle, unsafe sex, sharing needles, etc.)?   None reported.  Family history of substance and/or mental health diagnosis/issues?  None reported. Explain: N/A  History of abuse (Physical, Emotional, Sexual)? None reported.  Explain: N/A       Dimension III Risk Ratin  Reason Risk Rating Assigned:  Denies suicidal ideation/intent. Pt. Reports a MH diagnosis of PTSD, anxiety, and depression. Pt. Reports no current  services or medication.     Dimension IV Readiness to Change:    Mandated, or coerced into assessment or treatment: Yes  Does client feel there is a problem: No, in the past yes.  Verbalization of need/desire to change: No, feels he has already changed.   Willing to follow treatment recommendations: Yes, to comply with probation.   Impression of : low motivation, minimally cooperative.  Are there any spiritual, cultural, or other special needs to be addressed for client to be successful in treatment? No      Dimension IV Risk Ratin  Reason Risk Rating Assigned: Treatment is required by probation. Pt. Verbalizes awareness of past use issues. He reports not having a need for treatment , but willingness to comply.         Dimension V Relapse/Continued Use/Continued Problem Potential     Attended previous treatments: Yes, 6x.  Client age at First Treatment: 27  Longest Period of Abstinence: 7 years, age 20 to age 27. How did you accomplish this? On own and with treatment.  Circumstances which led to Relapse: Not being able to see his kids.   Risk Taking/Problem Behaviors Related to Use and/or Under the Influence: None reported.      Dimension V Risk Ratin  Reason Risk Rating Assigned: Pt. Reports prior experience with treatment and recovery. He reports a return to use when experiencing emotions from the death of his grandfather and other life stressors.       Dimension VI Recovery Environment   Family support:  Yes   Peer Sober Support:  Yes  Current living circumstances:  Lives with libby, her mother, and their 2 children.   Specific activities participating in which do not involve substance use: working, time spent with children and family.   Specific activities participating in which do involve substance use:  None reported.  Environment supportive of recovery: Yes  People, things  that threaten recovery:  No  Expected family involvement during treatment services: None at this time.  Current Legal Involvement: Yes, on probation in [facility]  Legal Consequences related to use:  Yes  Occupational/Academic consequences related to use:  No  Current ability to function in a work and/or education setting: currently employed with no reported issues.   Current support network for recovery (including community-based recovery support): Yes, libby is supportive. He reports having a sponsor, but infrequent sober support meeting attendance.   Do you belong to a [place]: No  Reside on reservation: No    Dimension VI Risk Ratin  Reason Risk Rating Assigned: Pt. is employed. He has legal involvement related to his use. Pt. reports having healthy family and peer support. He reports having weekly attendance to sober support meetings.       Amphetamine Use Disorder - Mild      Assessment Completed Within 3 Sessions of Admission: Yes  If NO, date assessment to be completed noted in Treatment Plan: Yes    Signature of Counselor:__KIMBERLY Alegre, [facility]________________________   Date and Time of Signature: ___12/3/2018, 3:25 PM________________

## 2021-06-22 NOTE — PROGRESS NOTES
Daily Group Note:    Micheal CELESTE Griggson attended 2 hours of group therapy today, Pt. Arrived late to group. T) Recovery Maintenance.     Total group size of 4.       Staff Name and Title: KIMBERLY Mason, [facility]    Date:  1/3/2019  Time:  7:16 PM

## 2021-06-22 NOTE — PROGRESS NOTES
Daily Group Note:    Micheal Ronquillo attended 2 hours of group therapy today. T) Nature of Addiction.     Total group size of 4.       Staff Name and Title: KIMBERLY Mason, [facility]    Date:  1/7/2019  Time:  8:38 PM

## 2021-06-22 NOTE — PROGRESS NOTES
Individual Treatment Plan    Patient  Name: Micheal Ronquillo  MRN: [id number]   : 1985  Admit Date: 12/3/18  Date of Initial Service Plan: 12/3/18  Tentative Discharge Date: 3/21/19  Diagnosis: Amphetamine Use Disorder - Mild  Counselor: KIMBERLY Mason, [facility]      Dimension 1: Acute Intoxication/Withdrawal Potential, Risk level: 0    Problem Statement from Comprehensive Assessment:  Last reported use, meth 18. No report of withdrawal issues/concerns, none observed.    Problem: None. No reported withdrawal concern/issues.  Goal: No need for goals/methods at this time.      Dimension 2: Biomedical Conditions/Complications, Risk level: 0    Problem Statement from Comprehensive Assessment:  No reported medical or health issues/concerns.      Problem: None. No reported medical issues/concerns.  Goal: No need for goals/methods at this time.        Dimension 3: Emotional/Behavioral/Cognitive, Risk level: 2    Problem Statement from Comprehensive Assessment:  Denies suicidal ideation/intent. Pt. Reports a MH diagnosis of PTSD, anxiety, and depression. Pt. Reports no current MH services or medication.    Problem: Unmanaged MH issues  Goal: Process difficult moods in a healthy way.  Must be reached to complete treatment? No  Methods/Strategies (must include amount and frequency):   Begin using coping skills learned in group (i.e., grounding, relaxation, thought challenging, mindfulness, etc.) as well as skills previously learned and share in daily check-in any benefits or changes that you experience using these skills.  Target Date: 3/21/19  Completion Date:     Problem: No medical insurance to obtain MH services.  Goal: Obtain medical insurance coverage  Must be reached to complete treatment? No  Methods/Strategies (must include amount and frequency):   Apply for medical insurance through the county or through employer. Notify counselor once you have completed the application process.   Target Date:  3/7/19  Completion Date:       Dimension 4: Readiness to Change, Risk level 2    Problem Statement from Comprehensive Assessment:  Treatment is required by probation. Pt. Verbalizes awareness of past use issues. He reports not having a need for treatment, but willingness to comply.    Problem: Treatment completion is required by probation.  Goal: Meet the expectation of probation to complete treatment.   Must be reached to complete treatment? Yes  Methods/Strategies (must include amount and frequency):   1. Attend EOP-CD group as scheduled, 4x weekly in phase 1 and 2x weekly in phase 2, 6 to 9 PM.   2. Contact staff in the event of any absences ([phone]).  Target Date: 3/21/19  Completion Date:       Dimension 5: Relapse/Continued Use/Continued Problem Potential, Risk level: 2    Problem Statement from Comprehensive Assessment:  Pt. Reports prior experience with treatment and recovery. He reports a return to use when experiencing emotions from the death of his grandfather and other life stressors.    Problem: Continued use despite recovery knowledge.  Goal: Process past use patterns to build insight.   Must be reached to complete treatment? Yes  Methods/Strategies (must include amount and frequency):   1. Review and complete the  1st Step/Inventory  assignment.  2. Set a group presentation date with counselor, present assignment and process in group.  Target Date: 1/31/19  Completion Date:     Problem: Need to build coping skills and application of skills to arrest from use.  Goal: Build knowledge and understanding of relapse prevention skills.  Must be reached to complete treatment? Yes  Methods/Strategies (must include amount and frequency):   1. Review and complete the  My Recovery Plan  assignment.  2. Set a group presentation date with counselor, present assignment and process in group.  Target Date: 2/28/19  Completion Date:       Dimension 6: Recovery Environment, Risk level: 2    Problem Statement from  Comprehensive Assessment:  Pt. is employed. He has legal involvement related to his use. Pt. reports having healthy family and peer support. He reports having weekly attendance to sober support meetings.    Problem: Need to build on healthy sober support.  Goal: Strengthen recovery support.  Must be reached to complete treatment? No  Methods/Strategies (must include amount and frequency):   Share weekly in group check-ins what relationships and resources are supportive to sobriety and any issues experienced with relationships or recovery supports.   Target Date: 3/21/19  Completion Date:       Resources  Resources to which the patient is being referred for problems when problems are to be addressed concurrently by another provider: None at this time.      By signing this document, I am acknowledging that I was actively and directly involved in the development of my treatment plan.       Patient  Signature:_________________________________________         Date:__________________       Staff Signature: KIMBERLY Mason, [facility]    Date: 12/6/18

## 2021-06-22 NOTE — PROGRESS NOTES
"Intake Note:   D) Micheal Ronquillo is a 33 y.o.  single [employer] male who is referred to OP CD treatment via probation and Rule 25 with funding from [facility]. Patient orientated x 3. Patient meets criteria for Amphetamine Use Disorder - Mild.  Patient appears appropriate for OP CD treatment services.   A) Completed intake assessment and preliminary paperwork. Patient was given and explained counselor & supervisor license number and contact info, Patient Bill of Rights, program rules/regulations, Program Abuse Prevention Plan, confidentiality & HIPPA policies, grievance procedure, presented ROIs, TB & HIV/AIDS policies & resources, and Vulnerable Adult policy.   Conducted Vulnerable Adult Assessment.   R)No special Vulnerable Adult needed at this time.  Patient signed and agreed to counselor & supervisor license number and contact info., Patient Bill of Rights, group rules/regulations, Program Abuse Prevention Plan, confidentiality & HIPPA policies, grievance procedure,  ROIs, TB & HIV/AIDS policies & resources, and Vulnerable Adult policy. Patient scored low risk on the [place] - Suicide Severity Rating Scale. Patient denied suicidal ideation/intent/plan/means at this time.     Opioid Use Disorder: No   Provided \"Options for Opioid Treatment in [place] and Overdose Prevention\" No    Dimension #1 - Withdrawal Potential - Risk 0.   Last reported use, meth 11/22/18.  Dimension #2 - Biomedical - Risk 0.   No reported medical/health issues.  Dimension #3 - Emotional , Behavioral and Cognitive - Risk 2.    Reports having a diagnosis of PTSD, depression, and anxiety. No MH services or medications.   Dimension #4 - Treatment Resistance - Risk 2.   Treatment required by probation. Awareness of use as an issues, but not seeing a need for treatment.   Dimension #5 - Relapse Potential - Risk 2.   Prior experience with treatment and recovery. Patterns of returning to use.   Dimension #6 - Recovery Environment - Risk 2. "   Pt. is employed. He has legal involvement related to his use. Pt. reports having healthy family and peer support. He reports having weekly attendance to sober support meetings.   T) Explained counselor & supervisor license number and contact info, Patient Bill of Rights, program rules/regulations, Probation Abuse Prevention Plan, confidentiality & HIPPA policies, grievance procedure, presented ROIs, TB & HIV/AIDS policies & resources, and Vulnerable Adult policy. Patient expected to start group on 12/10/18.      Kinjal Damon  12/3/2018, 4:55 PM

## 2021-06-22 NOTE — PROGRESS NOTES
Daily Group Note:    Micheal CELESTE JavierRonquillo attended 1 hour of group therapy today, Pt. arrived late to group. T) Recovery Maintenance.     Total group size of 3.    Staff Name and Title: KIMBERLY Mason, [facility]    Date:  12/27/2018  Time:  7:42 PM

## 2021-06-22 NOTE — PROGRESS NOTES
Weekly Progress Note  Micheal Ronquillo  1985  [id number]      D) Pt. attended 1 groups this week with 2 absences. Pt. attended 0 individual sessions this week. A) Staff facilitated groups and reviewed tx progress. Assessed for [facility]. R) No VAP needed at this time.    Any significant events, defines as events that impact patient s relationship with others inside and outside of treatment: No.  Indicate any changes or monitoring of physical or mental health problems: No.    Indicate involvement by any outside supports: Yes, girlfriend.   IAPP reviewed and modified as needed: N/A       Pt. working on the following dimensions:    Dimension #1 - Withdrawal Potential - Risk 0.     Specific goals from treatment plan addressed this week:  No goals needed, risk level 0.  Effectiveness of strategies:  Last reported use, meth 11/22/18.    Dimension #2 - Biomedical - Risk 0.     Specific goals from treatment plan addressed this week:  No goals needed, risk level 0.  Effectiveness of strategies:  No reported change in medical/health status.    Dimension #3 - Emotional/Behavioral/Cognitive - Risk 2.     Specific goals from treatment plan addressed this week:  Process difficult moods in a healthy way.   Obtain medical insurance coverage.  Effectiveness of strategies:  No reported change in MH status.    Dimension #4 - Treatment Acceptance/Resistance - Risk 2.    Specific goals from treatment plan addressed this week:  Meet the expectation of probation to complete treatment.  Effectiveness of strategies:  Pt. Attended group 1x this week, and is in phase one of the program. Pt. Reports continued transportation barriers that may cause him to arrive late to group sometimes.     Dimension #5 - Relapse Potential - Risk 2.     Specific goals from treatment plan addressed this week:  Process past use patterns to build insight.   Build knowledge and understanding of relapse prevention skills.   Effectiveness of strategies:  Pt. Reports  maintaining his sobriety this week. Pt. Reports the experience of urges to drink alcohol this week related to his relationship with his girlfriend. He reports he was able to cope and not go out to a bar, instead calming himself down and returning to his set routine for the day.    Dimension #6 - Recovery Environment - Risk 2.     Specific goals from treatment plan addressed this week:  Strengthen recovery support.   Effectiveness of strategies:  Pt. Reports maintaining employment. 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 Pt. Discussed in group his planned efforts for building involvement in his oldest son's life. He was open to healthy feedback from group peers.      T) Treatment plan updated: No.  Patient notified and in agreement: N/A.  Patient educated on: Distorted Self-Image. Patient has completed 8 of 84 program hours at this time. Projected discharge date is: TBD. Current discharge plan is: PENDING.       Staff Name and Title:   KIMBERLY Mason, [facility]  Date: 12/20/2018  Time: 8:29 PM      Psycho-Educational Curriculum  Educational Videos  Date (s) Attended    Nature of Addiction            Co-Occurring Disorders             Distorted Thinking            Community Support              Communication Skills             Unmanageable Feelings             Relapse Prevention             Relationships          12/10/18,  12/11/18,     Distorted Self Image         12/20/18,   Recovery Maintenance                                                             Mandatory Education:       HIV/AIDS

## 2021-06-22 NOTE — PROGRESS NOTES
Weekly Progress Note  Micheal Ronquillo  1985  [id number]      D) Pt. attended 2 groups this week with 2 absences. Pt. attended 0 individual sessions this week. A) Staff facilitated groups and reviewed tx progress. Assessed for [facility]. R) No VAP needed at this time.    Any significant events, defines as events that impact patient s relationship with others inside and outside of treatment: No.  Indicate any changes or monitoring of physical or mental health problems: No.    Indicate involvement by any outside supports: Yes, girlfriend.   IAPP reviewed and modified as needed: N/A       Pt. working on the following dimensions:    Dimension #1 - Withdrawal Potential - Risk 0.     Specific goals from treatment plan addressed this week:  No goals needed, risk level 0.  Effectiveness of strategies:  Last reported use, meth 11/22/18.    Dimension #2 - Biomedical - Risk 0.     Specific goals from treatment plan addressed this week:  No goals needed, risk level 0.  Effectiveness of strategies:  No reported change in medical/health status.    Dimension #3 - Emotional/Behavioral/Cognitive - Risk 2.     Specific goals from treatment plan addressed this week:  Process difficult moods in a healthy way.   Obtain medical insurance coverage.  Effectiveness of strategies:  No reported change in MH status.    Dimension #4 - Treatment Acceptance/Resistance - Risk 2.    Specific goals from treatment plan addressed this week:  Meet the expectation of probation to complete treatment.  Effectiveness of strategies:  Pt. Began attending group in the EOP program this week. Pt. Attended 2x this week, and is in phase one of the program. Pt. Expresses frustration with the treatment schedule and the transportation barriers he has.     Dimension #5 - Relapse Potential - Risk 2.     Specific goals from treatment plan addressed this week:  Process past use patterns to build insight.   Build knowledge and understanding of relapse prevention skills.    Effectiveness of strategies:  Pt. Reports maintaining his sobriety this week. Pt. Reports no experience of urges or triggers this week.    Dimension #6 - Recovery Environment - Risk 2.     Specific goals from treatment plan addressed this week:  Strengthen recovery support.   Effectiveness of strategies:  Pt. Reports maintaining employment. 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 Pt. Took part in group discussions this week on healthy socialization and relationships in recovery.     T) Treatment plan updated: No.  Patient notified and in agreement: N/A.  Patient educated on: Relationships. Patient has completed 5 of 84 program hours at this time. Projected discharge date is: TBD. Current discharge plan is: PENDING.       Staff Name and Title:   KIMBERLY Mason, [facility]  Date: 12/13/2018  Time: 8:05 PM    Psycho-Educational Curriculum  Educational Videos  Date (s) Attended    Nature of Addiction            Co-Occurring Disorders             Distorted Thinking            Community Support              Communication Skills             Unmanageable Feelings             Relapse Prevention             Relationships          12/10/18,  12/11/18,     Distorted Self Image            Recovery Maintenance                                                             Mandatory Education:       HIV/AIDS

## 2021-06-22 NOTE — PROGRESS NOTES
Weekly Progress Note  Micheal Ronquillo  1985  [id number]      D) Pt. attended 2 groups this week with 1 absences, holiday week. Pt. attended 0 individual sessions this week. A) Staff facilitated groups and reviewed tx progress. Assessed for [facility]. R) No VAP needed at this time.    Any significant events, defines as events that impact patient s relationship with others inside and outside of treatment: No.  Indicate any changes or monitoring of physical or mental health problems: No.    Indicate involvement by any outside supports: Yes, girlfriend.   IAPP reviewed and modified as needed: N/A       Pt. working on the following dimensions:    Dimension #1 - Withdrawal Potential - Risk 0.     Specific goals from treatment plan addressed this week:  No goals needed, risk level 0.  Effectiveness of strategies:  Last reported use, meth 11/22/18.    Dimension #2 - Biomedical - Risk 0.     Specific goals from treatment plan addressed this week:  No goals needed, risk level 0.  Effectiveness of strategies:  No reported change in medical/health status.    Dimension #3 - Emotional/Behavioral/Cognitive - Risk 2.     Specific goals from treatment plan addressed this week:  Process difficult moods in a healthy way.   Obtain medical insurance coverage.  Effectiveness of strategies:  No medical coverage in place. Mood appears healthy and positive. Pt. Processed in group feeling about his life stressors and received feedback on healthy coping.     Dimension #4 - Treatment Acceptance/Resistance - Risk 2.    Specific goals from treatment plan addressed this week:  Meet the expectation of probation to complete treatment.  Effectiveness of strategies:  Pt. Attended group 2x this week, and is in phase one of the program. Pt. Reports continued transportation barriers that may cause him to arrive late to group sometimes. Pt. Set a personal goal to attend all 4 sessions next week.     Dimension #5 - Relapse Potential - Risk 2.     Specific  goals from treatment plan addressed this week:  Process past use patterns to build insight.   Build knowledge and understanding of relapse prevention skills.   Effectiveness of strategies:  Pt. Reports maintaining his sobriety this week. Pt. Reports no experience of urges or triggers to use this week. Pt. Took past in a group discussion this week on honesty in recovery.     Dimension #6 - Recovery Environment - Risk 2.     Specific goals from treatment plan addressed this week:  Strengthen recovery support.   Effectiveness of strategies:  Pt. Reports maintaining employment. 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 Pt. Shared that his living environment with girlfriend can be stressful at times due to her arguing and negativity. He received feedback on healthy ways to cope and on self care.      T) Treatment plan updated: No.  Patient notified and in agreement: N/A.  Patient educated on: Recovery Maintenance. Patient has completed 16 of 84 program hours at this time. Projected discharge date is: TBD. Current discharge plan is: PENDING.       Staff Name and Title:   KIMBERLY Mason, [facility]  Date: 1/4/2019  Time: 8:42 AM      Psycho-Educational Curriculum  Educational Videos  Date (s) Attended    Nature of Addiction            Co-Occurring Disorders             Distorted Thinking            Community Support              Communication Skills             Unmanageable Feelings             Relapse Prevention             Relationships          12/10/18,  12/11/18,     Distorted Self Image         12/20/18,   Recovery Maintenance          12/26/18,  12/27/18,  1/2/19,  1/3/19,                                                   Mandatory Education:       HIV/AIDS

## 2021-06-22 NOTE — PROGRESS NOTES
Daily Group Note:    Micheal ALONSO Ronquillo attended 2 hours of group therapy today. T) Relationships.     Total group size of 3.     Staff Name and Title: KIMBERLY Mason, [facility]    Date:  12/11/2018  Time:  8:17 PM

## 2021-06-22 NOTE — TELEPHONE ENCOUNTER
Phone Call:    D) Pt. called this date and left message re: inability to attend group this date due to his children being ill. He plans to return to group on 1/10/19..     Staff Name and Title:   KIMBERLY Alegre, [facility]  1/9/2019, 8:05 PM

## 2021-06-22 NOTE — PROGRESS NOTES
Weekly Progress Note  Micheal Ronquillo  1985  [id number]      D) Pt. attended 2 groups this week with 0 absences, holiday week. Pt. attended 0 individual sessions this week. A) Staff facilitated groups and reviewed tx progress. Assessed for [facility]. R) No VAP needed at this time.    Any significant events, defines as events that impact patient s relationship with others inside and outside of treatment: No.  Indicate any changes or monitoring of physical or mental health problems: No.    Indicate involvement by any outside supports: Yes, girlfriend.   IAPP reviewed and modified as needed: N/A       Pt. working on the following dimensions:    Dimension #1 - Withdrawal Potential - Risk 0.     Specific goals from treatment plan addressed this week:  No goals needed, risk level 0.  Effectiveness of strategies:  Last reported use, meth 11/22/18.    Dimension #2 - Biomedical - Risk 0.     Specific goals from treatment plan addressed this week:  No goals needed, risk level 0.  Effectiveness of strategies:  No reported change in medical/health status.    Dimension #3 - Emotional/Behavioral/Cognitive - Risk 2.     Specific goals from treatment plan addressed this week:  Process difficult moods in a healthy way.   Obtain medical insurance coverage.  Effectiveness of strategies:  No medical coverage in place. Pt. Reports happy feelings this week from a positive holiday experience. No mood issues reported.     Dimension #4 - Treatment Acceptance/Resistance - Risk 2.    Specific goals from treatment plan addressed this week:  Meet the expectation of probation to complete treatment.  Effectiveness of strategies:  Pt. Attended group 2x this week, and is in phase one of the program. Pt. Reports continued transportation barriers that may cause him to arrive late to group sometimes.     Dimension #5 - Relapse Potential - Risk 2.     Specific goals from treatment plan addressed this week:  Process past use patterns to build insight.    Build knowledge and understanding of relapse prevention skills.   Effectiveness of strategies:  Pt. Reports maintaining his sobriety this week. Pt. Reports the experience of urges to use this week related to the stress of holiday preparations. He reports coping by reminding himself that using will only take him away from his kids and disrupt the holidays.    Dimension #6 - Recovery Environment - Risk 2.     Specific goals from treatment plan addressed this week:  Strengthen recovery support.   Effectiveness of strategies:  Pt. Reports maintaining employment. 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 Pt. Shared that he got to have all 3 of his kids for the holiday and spend time with them all together.     T) Treatment plan updated: No.  Patient notified and in agreement: N/A.  Patient educated on: Recovery Maintenance. Patient has completed 11 of 84 program hours at this time. Projected discharge date is: TBD. Current discharge plan is: PENDING.       Staff Name and Title:   KIMBERLY Mason, [facility]  Date: 12/27/2018  Time: 7:39 PM      Psycho-Educational Curriculum  Educational Videos  Date (s) Attended    Nature of Addiction            Co-Occurring Disorders             Distorted Thinking            Community Support              Communication Skills             Unmanageable Feelings             Relapse Prevention             Relationships          12/10/18,  12/11/18,     Distorted Self Image         12/20/18,   Recovery Maintenance          12/26/18,  12/27/18,                                                   Mandatory Education:       HIV/AIDS

## 2021-06-22 NOTE — PROGRESS NOTES
1st Group Session:    D) Pt. Attended 3 hours of group this date. A) Staff facilitated group and reviewed goals. Pt. introduced self  and reported on CD history. Pt. Participated appropriately and appeared open and honest.   T) Relationships.    Total group size: 5.    Staff Name and Title: KIMBERLY Mason, [facility]    Date:  12/10/2018  Time:  7:40 PM

## 2021-06-23 NOTE — PROGRESS NOTES
Weekly Progress Note  Micheal Ronquillo  1985  [id number]      D) Pt. attended 2 groups this week with 2 absences. Pt. attended 0 individual sessions this week. A) Staff facilitated groups and reviewed tx progress. [facility] for [facility]. R) No VAP needed at this time.    Any significant events, defines as events that impact patient s relationship with others inside and outside of treatment: Left the living environment he shared with girlfriend and children due to arguing.   Indicate any changes or monitoring of physical or mental health problems: No.    Indicate involvement by any outside supports: No   IAPP reviewed and modified as needed: N/A       Pt. working on the following dimensions:    Dimension #1 - Withdrawal Potential - Risk 0.     Specific goals from treatment plan addressed this week:  No goals needed, risk level 0.  Effectiveness of strategies:  Last reported use, meth 11/22/18.    Dimension #2 - Biomedical - Risk 0.     Specific goals from treatment plan addressed this week:  No goals needed, risk level 0.  Effectiveness of strategies:  No reported change in medical/health status.    Dimension #3 - Emotional/Behavioral/Cognitive - Risk 2.     Specific goals from treatment plan addressed this week:  Process difficult moods in a healthy way.   Obtain medical insurance coverage.  Effectiveness of strategies:  No medical coverage in place. Mood appears stresses and agitated. Pt. Processed in group feeling about his life stressors, mainly legal and relationship issues, and received feedback on healthy coping.     Dimension #4 - Treatment Acceptance/Resistance - Risk 2.    Specific goals from treatment plan addressed this week:  Meet the expectation of probation to complete treatment.  Effectiveness of strategies:  Pt. Attended group 2x this week, and is in phase one of the program. Pt. Reports continued transportation barriers that may cause him to arrive late to group sometimes.      Dimension #5 - Relapse  Potential - Risk 2.     Specific goals from treatment plan addressed this week:  Process past use patterns to build insight.   Build knowledge and understanding of relapse prevention skills.   Effectiveness of strategies:  Pt. Reports maintaining his sobriety this week. Pt. Reports experience of urges or triggers to use this week. Pt. Reports ability to cope by using healthy self talk to self correct his thinking.     Dimension #6 - Recovery Environment - Risk 2.     Specific goals from treatment plan addressed this week:  Strengthen recovery support.   Effectiveness of strategies:  Pt. Reports maintaining employment. 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 Pt. Shared that his left/moved out of the living environment with girlfriend over the weekend that can be stressful at times due to her arguing and negativity. He reports missing being in the home with his children and hopes to be able to reach an agreement on visitation. .      T) Treatment plan updated: No.  Patient notified and in agreement: N/A.  Patient educated on: Nature of Addiction. Patient has completed 20 of 84 program hours at this time. Projected discharge date is: TBD. Current discharge plan is: PENDING.       Staff Name and Title:   KIMBERLY Mason, [facility]  Date: 1/10/2019  Time: 7:00 PM    Psycho-Educational Curriculum  Educational Videos  Date (s) Attended    Nature of Addiction         1/7/19,  1/8/19,     Co-Occurring Disorders             Distorted Thinking            Community Support              Communication Skills             Unmanageable Feelings             Relapse Prevention             Relationships          12/10/18,  12/11/18,     Distorted Self Image         12/20/18,   Recovery Maintenance          12/26/18,  12/27/18,  1/2/19,  1/3/19,                                                   Mandatory Education:       HIV/AIDS

## 2021-06-23 NOTE — PROGRESS NOTES
"Daily Group Note:    Micheal Ronquillo attended 2 hours of group therapy today. T) Distorted Thinking.     Total group size of 4.    1st Step:    D) Pt. Presented \"1st Step\" assignment to EOP group this date. Pt. received feedback on \"1st Step\" assignment presentation from group peers.    R) Pt. Verbalized powerless and unmanageability over DOC.    Staff Name and Title: KIMBERLY Mason, [facility]    Date:  1/23/2019  Time:  9:01 PM        "

## 2021-06-23 NOTE — PROGRESS NOTES
Daily Group Note:    Micheal CELESTE Ronquillo attended 2 hours of group therapy today. T) Spiritual Care and Co-Occurring Disorders.     Total group size of 6.    Staff Name and Title: KIMBERLY Mason, [facility]    Date:  1/15/2019  Time:  8:58 PM

## 2021-06-23 NOTE — TELEPHONE ENCOUNTER
Phone Call:    D) Pt. called this date and left message re: due to illness being unable to attend group this date, planning to return on 1/14/19..     Staff Name and Title:   KIMBERLY Alegre, [facility]  1/10/2019, 6:03 PM

## 2021-06-23 NOTE — PROGRESS NOTES
Daily Group Note:    Micheal CELESTE JavierRonquillo attended 2 hours of group therapy today. T) Communication.     Total group size of 7.    Pt. Reports maintaining sobriety.  No report of immediate needs.     Staff Name and Title: KIMBERLY Mason, [facility]    Date:  2/4/2019  Time:  9:15 PM

## 2021-06-23 NOTE — PROGRESS NOTES
Daily Group Note:    Micheal CELESTE JavierRonquillo attended 2 hours of group therapy today. T) Co-Occurring Disorders.     Total group size of 5.    Staff Name and Title: KIMBERLY Mason, [facility]    Date:  1/16/2019  Time:  9:02 PM

## 2021-06-23 NOTE — TELEPHONE ENCOUNTER
Phone Call:    D) P.O. Sharon Benson called this date and left message re: request for UA results.   Fax was sent this date of lab results for Pt. UA results.     Staff Name and Title:   KIMBERLY Alegre, [facility]  1/22/2019, 2:06 PM

## 2021-06-23 NOTE — PROGRESS NOTES
Daily Group Note:    Micheal ALONSO Ronquillo attended 2 hours of group therapy today. T) Co-Occurring Disorders.     Total group size of 6.    Staff Name and Title: KIMBERLY Mason, [facility]    Date:  1/17/2019  Time:  8:49 PM

## 2021-06-23 NOTE — PROGRESS NOTES
Weekly Progress Note  Micheal Ronquillo  1985  [id number]      D) Pt. attended 3 groups this week with 0 absences. Pt. attended 0 individual sessions this week. A) Staff facilitated groups and reviewed tx progress. Assessed for [facility]. R) No VAP needed at this time.    Any significant events, defines as events that impact patient s relationship with others inside and outside of treatment: Pt. Is no longer living with girlfriend and children due to arguing.   Indicate any changes or monitoring of physical or mental health problems: No.    Indicate involvement by any outside supports: No   IAPP reviewed and modified as needed: N/A       Pt. working on the following dimensions:    Dimension #1 - Withdrawal Potential - Risk 0.     Specific goals from treatment plan addressed this week:  No goals needed, risk level 0.  Effectiveness of strategies:  Last reported use, meth 11/22/18.    Dimension #2 - Biomedical - Risk 0.     Specific goals from treatment plan addressed this week:  No goals needed, risk level 0.  Effectiveness of strategies:  No reported change in medical/health status.    Dimension #3 - Emotional/Behavioral/Cognitive - Risk 2.     Specific goals from treatment plan addressed this week:  Process difficult moods in a healthy way.   Obtain medical insurance coverage.  Effectiveness of strategies:  No medical coverage in place. Mood appears healthy and positive. Pt. Reports a positive mood this week. He processed in a group discussion feelings and issues he has dealt with throughout life from being adopted and not knowing his birth mother.     Dimension #4 - Treatment Acceptance/Resistance - Risk 2.    Specific goals from treatment plan addressed this week:  Meet the expectation of probation to complete treatment.  Effectiveness of strategies:  Pt. Attended group 3x this week, and is in phase one of the program. Pt. Reports continued transportation barriers that may cause him to arrive late to group.  Pt.  "Called in and reported unable to attend group on 1/24/19 due to transportation issues.     Dimension #5 - Relapse Potential - Risk 2.     Specific goals from treatment plan addressed this week:  Process past use patterns to build insight.   Build knowledge and understanding of relapse prevention skills.   Effectiveness of strategies:  Pt. Reports maintaining his sobriety this week. Pt. Reports no experience of urges or triggers to use this week. Pt. Presented his \"1st Step\" assignment in group this week. Pt. Shared his use history and the extensive legal issues that his using has caused. Pt. Reports he struggles to not identify as a \"bad\" person from the crimes he has committed. He was open and able to process those feelings and receive positive feedback. Counselor also explained the difference of guilt and shame in relation to how he is identifying himself.     Dimension #6 - Recovery Environment - Risk 2.     Specific goals from treatment plan addressed this week:  Strengthen recovery support.   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 Pt. Reports still being removed from his girlfriend's/mother of children's living environment due to the stress, arguing, and negativity. He reports he has been attending his son's basketball games and enjoys being able to encourage him.       T) Treatment plan updated: No.  Patient notified and in agreement: N/A.  Patient educated on: Distorted Thinking. Patient has completed 33 of 84 program hours at this time. Projected discharge date is: TBD. Current discharge plan is: PENDING.       Staff Name and Title:   KIMBERLY Mason, [facility]  Date: 1/24/2019  Time: 9:08 PM    Psycho-Educational Curriculum  Educational Videos  Date (s) Attended    Nature of Addiction         1/7/19,  1/8/19,     Co-Occurring Disorders          1/14/19,  1/15/19,  1/16/19,  1/17/19, "   Distorted Thinking         1/21/19,  1/22/19,  1/23/19,     Community Support              Communication Skills             Unmanageable Feelings             Relapse Prevention             Relationships          12/10/18,  12/11/18,     Distorted Self Image         12/20/18,   Recovery Maintenance          12/26/18,  12/27/18,  1/2/19,  1/3/19,                                                   Mandatory Education:       HIV/AIDS

## 2021-06-23 NOTE — PROGRESS NOTES
Daily Group Note:    Micheal Ronquillo attended 2 hours of group therapy today. T) Co-Occurring Disorders.     Total group size of 6.    Pt. Reports maintaining sobriety.  No report of immediate needs.     Staff Name and Title: KIMBERLY Mason, [facility]    Date:  1/14/2019  Time:  8:01 PM

## 2021-06-23 NOTE — TELEPHONE ENCOUNTER
Phone Call:    D) P.OMacario Sharon Benson called in this date re: Pt. Sobriety concerns.  P.O. Reports she received an anonymous phone call claming the Pt. Is using meth. Counselor reported that the Pt. Has had no positive UAs with treatment and not having any immediate concerns for observing any concerning behaviors.  P.O. Requests that the Pt. Continue to be monitored through UAs and group attendance. Counselor faxed to P.O. UA results from 2/5/19 for her review this date.       Staff Name and Title:   KIMBERLY Alegre, [facility]  2/11/2019, 2:28 PM

## 2021-06-23 NOTE — PROGRESS NOTES
Weekly Progress Note  Micheal Ronquillo  1985  [id number]      D) Pt. attended 4 groups this week with 0 absences. Pt. attended 0 individual sessions this week. A) Staff facilitated groups and reviewed tx progress. Assessed for [facility]. R) No VAP needed at this time.    Any significant events, defines as events that impact patient s relationship with others inside and outside of treatment: Left the living environment he shared with girlfriend and children due to arguing.   Indicate any changes or monitoring of physical or mental health problems: No.    Indicate involvement by any outside supports: No   IAPP reviewed and modified as needed: N/A       Pt. working on the following dimensions:    Dimension #1 - Withdrawal Potential - Risk 0.     Specific goals from treatment plan addressed this week:  No goals needed, risk level 0.  Effectiveness of strategies:  Last reported use, meth 11/22/18.    Dimension #2 - Biomedical - Risk 0.     Specific goals from treatment plan addressed this week:  No goals needed, risk level 0.  Effectiveness of strategies:  No reported change in medical/health status.    Dimension #3 - Emotional/Behavioral/Cognitive - Risk 2.     Specific goals from treatment plan addressed this week:  Process difficult moods in a healthy way.   Obtain medical insurance coverage.  Effectiveness of strategies:  No medical coverage in place. Mood appears healthy and positive. Pt. Reports an improved mood this week. He began to get agitated when discussing his legal situation in group and counselor was able to bring his physical reactions and mood change to his attention. Pt. reports he want to build his self awareness so he can catch and prevent negative elevations of his mood in the future and promote positivity.     Dimension #4 - Treatment Acceptance/Resistance - Risk 2.    Specific goals from treatment plan addressed this week:  Meet the expectation of probation to complete treatment.  Effectiveness of  strategies:  Pt. Attended group 4x this week, and is in phase one of the program. Pt. Reports continued transportation barriers that may cause him to arrive late to group.      Dimension #5 - Relapse Potential - Risk 2.     Specific goals from treatment plan addressed this week:  Process past use patterns to build insight.   Build knowledge and understanding of relapse prevention skills.   Effectiveness of strategies:  Pt. Reports maintaining his sobriety this week. Pt. Reports experience of urges or triggers to use this week. Pt. Reports ability to cope by using healthy self talk to self correct his thinking. He reports it helps when he thinks of his children and his desire to be present in their life.     Dimension #6 - Recovery Environment - Risk 2.     Specific goals from treatment plan addressed this week:  Strengthen recovery support.   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 Pt. Reports still being removed from his girlfriend's/mother of children's living environment due to the stress, arguing, and negativity. He reports missing being in the home with his children and hopes to be able to reach an agreement on visitation.       T) Treatment plan updated: No.  Patient notified and in agreement: N/A.  Patient educated on: Co-Occurring Disorders. Patient has completed 28 of 84 program hours at this time. Projected discharge date is: TBD. Current discharge plan is: PENDING.       Staff Name and Title:   KIMBERLY Mason, [facility]  Date: 1/18/2019  Time: 7:28 AM    Psycho-Educational Curriculum  Educational Videos  Date (s) Attended    Nature of Addiction         1/7/19,  1/8/19,     Co-Occurring Disorders          1/14/19,  1/15/19,  1/16/19,  1/17/19,   Distorted Thinking            Community Support              Communication Skills             Unmanageable Feelings             Relapse Prevention              Relationships          12/10/18,  12/11/18,     Distorted Self Image         12/20/18,   Recovery Maintenance          12/26/18,  12/27/18,  1/2/19,  1/3/19,                                                   Mandatory Education:       HIV/AIDS

## 2021-06-23 NOTE — PROGRESS NOTES
Daily Group Note:    Micheal CELESTE Ronquillo attended 2 hours of group therapy today. T) Distorted Thinking.     Total group size of 7.    Staff Name and Title: KIMBERLY Mason, [facility]    Date:  1/22/2019  Time:  8:50 PM

## 2021-06-23 NOTE — PROGRESS NOTES
Daily Group Note:    Micheal ALONSO Ronquillo attended 2 hours of group therapy today. T) Community Resources.     Total group size of 6.    Pt. Reports maintaining sobriety.  No report of immediate needs.     Staff Name and Title: KIMBERLY Mason, [facility]    Date:  1/28/2019  Time:  8:59 PM

## 2021-06-23 NOTE — PROGRESS NOTES
Daily Group Note:    Micheal ALONSO Ronquillo attended 2 hours of group therapy today. T) Community Resources.     Total group size of 6.    Staff Name and Title: KIMBERLY Mason, [facility]    Date:  1/29/2019  Time:  7:14 PM

## 2021-06-23 NOTE — PROGRESS NOTES
Daily Group Note:    Micheal Ronquillo attended 1 hour of group therapy today. T) Unmanageable Feelings.     Total group size of 8.    Pt. Reports maintaining sobriety.  No report of immediate needs.     Staff Name and Title: KIMBERLY Mason, [facility]    Date:  2/11/2019  Time:  8:56 PM

## 2021-06-23 NOTE — PROGRESS NOTES
Daily Group Note:    Micheal CELESTE Ronquillo attended 1 hours of group therapy today. T) Communication.     Total group size of 3.     Staff Name and Title: KIMBERLY Mason, [facility]    Date:  2/5/2019  Time:  8:17 PM

## 2021-06-23 NOTE — PROGRESS NOTES
Weekly Progress Note  Micheal Ronquillo  1985  [id number]      D) Pt. attended 2 groups this week with 0 absences. Pt. attended 0 individual sessions this week. A) Staff facilitated groups and reviewed tx progress. Assessed for [facility]. R) No VAP needed at this time.    Any significant events, defines as events that impact patient s relationship with others inside and outside of treatment: No.   Indicate any changes or monitoring of physical or mental health problems: No.    Indicate involvement by any outside supports: No   IAPP reviewed and modified as needed: N/A       Pt. working on the following dimensions:    Dimension #1 - Withdrawal Potential - Risk 0.     Specific goals from treatment plan addressed this week:  No goals needed, risk level 0.  Effectiveness of strategies:  Last reported use, meth 11/22/18.    Dimension #2 - Biomedical - Risk 0.     Specific goals from treatment plan addressed this week:  No goals needed, risk level 0.  Effectiveness of strategies:  No reported change in medical/health status.    Dimension #3 - Emotional/Behavioral/Cognitive - Risk 2.     Specific goals from treatment plan addressed this week:  Process difficult moods in a healthy way.   Obtain medical insurance coverage.  Effectiveness of strategies:  No medical coverage in place. Mood appears healthy and positive. Pt. Reports a positive mood this week. He reports awareness of how he can maintain a healthier mood when distant from negative influences. Pt. Also identified a personal difficulty with taking compliments and praise. Counselor acknowledged one of his achievements and he quickly discredited himself. Counselor has challenged Pt. To simply say thank you and nothing else when anyone acknowledges some positive about you.     Dimension #4 - Treatment Acceptance/Resistance - Risk 2.    Specific goals from treatment plan addressed this week:  Meet the expectation of probation to complete treatment.  Effectiveness of  strategies:  Pt. Attended group 2x this week, and is now in phase two of the program. Pt. Reports continued transportation barriers that may cause him to arrive late to group.     Dimension #5 - Relapse Potential - Risk 2.     Specific goals from treatment plan addressed this week:  Process past use patterns to build insight.   Build knowledge and understanding of relapse prevention skills.   Effectiveness of strategies:  Pt. Reports maintaining his sobriety this week. Pt. Reports no experience of urges or triggers to use this week.     Dimension #6 - Recovery Environment - Risk 2.     Specific goals from treatment plan addressed this week:  Strengthen recovery support.   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 Pt. Reports he continues attending his son's basketball games and enjoys being able to encourage him. Pt. Reports this week he reached out to his P.O. About resources for sober living or other supportive  Living. Pt. Reports he is having issues with finding a place that will rent to him given his legal record.     T) Treatment plan updated: No.  Patient notified and in agreement: N/A.  Patient educated on: Communication. Patient has completed 41 of 84 program hours at this time. Projected discharge date is: TBD. Current discharge plan is: PENDING.       Staff Name and Title:   KIMBERLY Mason, [facility]  Date: 2/8/2019  Time: 8:25 AM    Psycho-Educational Curriculum  Educational Videos  Date (s) Attended    Nature of Addiction         1/7/19,  1/8/19,     Co-Occurring Disorders          1/14/19,  1/15/19,  1/16/19,  1/17/19,   Distorted Thinking         1/21/19,  1/22/19,  1/23/19,     Community Support           1/28/19,  1/29/19,     Communication Skills          1/4/19,  1/5/19,     Unmanageable Feelings             Relapse Prevention             Relationships          12/10/18,  12/11/18,     Distorted Self Image         12/20/18,   Recovery Maintenance           12/26/18,  12/27/18,  1/2/19,  1/3/19,                                                   Mandatory Education:       HIV/AIDS

## 2021-06-24 NOTE — TELEPHONE ENCOUNTER
Phone Call:    D) Call was made to AMINA Hanson this date and message left re: Pt. Progress update, lab/UA results, and requesting call back.      Staff Name and Title:   KIMBERLY Alegre, [facility]  3/12/2019, 1:42 PM

## 2021-06-24 NOTE — PROGRESS NOTES
Daily Group Note:    Micheal ALONSO Ronquillo attended 2 hours of group therapy today. Pt. Arrived late to group. T) Distorted Self-Image.     Total group size of 4.     Staff Name and Title: KIMBERLY Mason, [facility]    Date:  3/4/2019  Time:  8:54 PM

## 2021-06-24 NOTE — PROGRESS NOTES
Daily Group Note:    Micheal CELESTE JavierRonquillo attended 1 hour of group therapy today. Pt. Arrived late to group. T) Relationships.     Total group size of 3.     Staff Name and Title: KIMBERLY Mason, [facility]    Date:  2/28/2019  Time:  8:19 PM

## 2021-06-24 NOTE — PROGRESS NOTES
Weekly Progress Note  Micheal Ronquillo  1985  [id number]      D) Pt. attended 2 groups this week with 0 absences. Pt. attended 0 individual sessions this week. A) Staff facilitated groups and reviewed tx progress. Assessed for [facility]. R) No VAP needed at this time.    Any significant events, defines as events that impact patient s relationship with others inside and outside of treatment: No.   Indicate any changes or monitoring of physical or mental health problems: No.    Indicate involvement by any outside supports: No   IAPP reviewed and modified as needed: N/A       Pt. working on the following dimensions:    Dimension #1 - Withdrawal Potential - Risk 0.     Specific goals from treatment plan addressed this week:  No goals needed, risk level 0.  Effectiveness of strategies:  Last reported use, meth 11/22/18.    Dimension #2 - Biomedical - Risk 0.     Specific goals from treatment plan addressed this week:  No goals needed, risk level 0.  Effectiveness of strategies:  No reported change in medical/health status.    Dimension #3 - Emotional/Behavioral/Cognitive - Risk 2.     Specific goals from treatment plan addressed this week:  Process difficult moods in a healthy way.   Obtain medical insurance coverage.  Effectiveness of strategies:  No medical coverage in place. Mood appears healthy and positive.       Dimension #4 - Treatment Acceptance/Resistance - Risk 2.    Specific goals from treatment plan addressed this week:  Meet the expectation of probation to complete treatment.  Effectiveness of strategies:  Pt. Attended group 2x this week. He is in phase two of the program. Pt. Reports continued transportation barriers that cause him to arrive late to group, attending 2 out of 3 hours of group on average. Pt. Attend 1 hour of group in each session he attended this week.    Dimension #5 - Relapse Potential - Risk 2.     Specific goals from treatment plan addressed this week:  Process past use patterns to build  insight.   Build knowledge and understanding of relapse prevention skills.   Effectiveness of strategies:  Pt. Reports maintaining his sobriety this week. Pt. Reports no experience of urges or triggers to use this week.     Dimension #6 - Recovery Environment - Risk 2.     Specific goals from treatment plan addressed this week:  Strengthen recovery support.   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     T) Treatment plan updated: No.  Patient notified and in agreement: N/A.  Patient educated on: Relationships. Patient has completed 48 of 84 program hours at this time. Projected discharge date is: TBD. Current discharge plan is: PENDING.       Staff Name and Title:   KIMBERLY Mason, [facility]  Date: 2/28/2019  Time: 8:31 PM      Psycho-Educational Curriculum  Educational Videos  Date (s) Attended    Nature of Addiction         1/7/19,  1/8/19,     Co-Occurring Disorders          1/14/19,  1/15/19,  1/16/19,  1/17/19,   Distorted Thinking         1/21/19,  1/22/19,  1/23/19,     Community Support           1/28/19,  1/29/19,     Communication Skills          1/4/19,  1/5/19,     Unmanageable Feelings       Happy  2/11/19,  2/12/19,   Relapse Prevention          2/18/19,   Relationships          12/10/18,  12/11/18,  2/26/19,  2/28/19,   Distorted Self Image         12/20/18,   Recovery Maintenance          12/26/18,  12/27/18,  1/2/19,  1/3/19,                                                   Mandatory Education:       HIV/AIDS

## 2021-06-24 NOTE — TELEPHONE ENCOUNTER
Phone Call:    D) Pt. called this date and left message re: request for call back. Call back was made this date, Pt. Had questions about results from his past UAs.      Staff Name and Title:   KIMBERLY Alegre, [facility]  3/7/2019, 4:25 PM

## 2021-06-24 NOTE — PROGRESS NOTES
Weekly Progress Note  Micheal Ronquillo  1985  [id number]      D) Pt. attended 1 groups this week with 1 absences. Pt. attended 0 individual sessions this week. A) Staff facilitated groups and reviewed tx progress. Assessed for [facility]. R) No VAP needed at this time.    Any significant events, defines as events that impact patient s relationship with others inside and outside of treatment: No.   Indicate any changes or monitoring of physical or mental health problems: No.    Indicate involvement by any outside supports: No   IAPP reviewed and modified as needed: N/A       Pt. working on the following dimensions:    Dimension #1 - Withdrawal Potential - Risk 0.     Specific goals from treatment plan addressed this week:  No goals needed, risk level 0.  Effectiveness of strategies:  Last reported use, meth 11/22/18.    Dimension #2 - Biomedical - Risk 0.     Specific goals from treatment plan addressed this week:  No goals needed, risk level 0.  Effectiveness of strategies:  No reported change in medical/health status.    Dimension #3 - Emotional/Behavioral/Cognitive - Risk 2.     Specific goals from treatment plan addressed this week:  Process difficult moods in a healthy way.   Obtain medical insurance coverage.  Effectiveness of strategies:  No medical coverage in place. Mood appears healthy and positive. Pt. Reports a positive mood this week.      Dimension #4 - Treatment Acceptance/Resistance - Risk 2.    Specific goals from treatment plan addressed this week:  Meet the expectation of probation to complete treatment.  Effectiveness of strategies:  Pt. Attended group 1x this week. He is in phase two of the program. Pt. Reports continued transportation barriers that cause him to arrive late to group, attending 2 out of 3 hours of group on average.     Dimension #5 - Relapse Potential - Risk 2.     Specific goals from treatment plan addressed this week:  Process past use patterns to build insight.   Build knowledge  and understanding of relapse prevention skills.   Effectiveness of strategies:  Pt. Reports maintaining his sobriety this week. Pt. Reports no experience of urges or triggers to use this week. Pt. Took part in a group discussion learning relapse terms and understanding relapse as a process.     Dimension #6 - Recovery Environment - Risk 2.     Specific goals from treatment plan addressed this week:  Strengthen recovery support.   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      T) Treatment plan updated: No.  Patient notified and in agreement: N/A.  Patient educated on: Relapse Prevention. Patient has completed 46 of 84 program hours at this time. Projected discharge date is: TBD. Current discharge plan is: PENDING.       Staff Name and Title:   KIMBERLY Mason, [facility]  Date: 2/21/2019  Time: 8:38 PM      Psycho-Educational Curriculum  Educational Videos  Date (s) Attended    Nature of Addiction         1/7/19,  1/8/19,     Co-Occurring Disorders          1/14/19,  1/15/19,  1/16/19,  1/17/19,   Distorted Thinking         1/21/19,  1/22/19,  1/23/19,     Community Support           1/28/19,  1/29/19,     Communication Skills          1/4/19,  1/5/19,     Unmanageable Feelings       Happy  2/11/19,  2/12/19,   Relapse Prevention          2/18/19,   Relationships          12/10/18,  12/11/18,     Distorted Self Image         12/20/18,   Recovery Maintenance          12/26/18,  12/27/18,  1/2/19,  1/3/19,                                                   Mandatory Education:       HIV/AIDS

## 2021-06-24 NOTE — PROGRESS NOTES
Daily Group Note:    Micheal CELESTE JavierRonquillo attended 2 hours of group therapy today. T) Recovery Maintenance.     Total group size of 5.    Staff Name and Title: KIMBERLY Mason, [facility]    Date:  3/13/2019  Time:  9:00 PM

## 2021-06-24 NOTE — PROGRESS NOTES
Daily Group Note:    Micheal Ronquillo attended 1 hour of group therapy today.Pt. Arrived late to group. T) Relationships.     Total group size of 6.    Pt. Reports maintaining sobriety.  No reported immediate needs.     Staff Name and Title: KIMBERLY Mason, [facility]    Date:  2/26/2019  Time:  8:48 PM

## 2021-06-24 NOTE — PROGRESS NOTES
Daily Group Note:    Micheal CELESTE JavierRonquillo attended 2 hours of group therapy today. T) Distorted Self-Image.     Total group size of 6.    Staff Name and Title: KIMBERLY Mason, [facility]    Date:  3/6/2019  Time:  8:45 PM

## 2021-06-24 NOTE — TELEPHONE ENCOUNTER
Phone Call:    D) P.O. Sharon Benson called this date and left message re: concerns for Pt.'s compliance with probation and reporting for a requested UA last week.   Call back was made this date, but no answer. Counselor will follow up with Pt. And P.O.    Staff Name and Title:   KIMBERLY Alegre, [facility]  3/11/2019, 2:37 PM

## 2021-06-24 NOTE — PROGRESS NOTES
Daily Group Note:    Micheal Ronquillo attended 2 hours of group therapy today. T) Recovery Maintenance.     Total group size of 5.    Pt. Reports maintaining sobriety.  No reported immediate needs.  Pt. Was tearful in group when discussing his relationship issues with the mother of his 2 youngest children.  Pt. Was open to healthy feedback from group peers and thought changing strategies suggested by counselor.  Counselor also addressed the concerns of his P.O. With the Pt. The Pt. Reports plans to follow up with probation.      Late entry note for this date: 3/11/19.    Staff Name and Title: KIMBERLY Mason, [facility]    Date:  3/12/2019  Time:  1:27 PM

## 2021-06-24 NOTE — PROGRESS NOTES
Weekly Progress Note  Micheal Ronquillo  1985  [id number]      D) Pt. attended 3 groups this week with 0 absences. Pt. attended 0 individual sessions this week. A) Staff facilitated groups and reviewed tx progress. Assessed for [facility]. R) No VAP needed at this time.    Any significant events, defines as events that impact patient s relationship with others inside and outside of treatment: No.   Indicate any changes or monitoring of physical or mental health problems: No.    Indicate involvement by any outside supports: No   IAPP reviewed and modified as needed: N/A       Pt. working on the following dimensions:    Dimension #1 - Withdrawal Potential - Risk 0.     Specific goals from treatment plan addressed this week:  No goals needed, risk level 0.  Effectiveness of strategies:  Last reported use, meth 11/22/18.    Dimension #2 - Biomedical - Risk 0.     Specific goals from treatment plan addressed this week:  No goals needed, risk level 0.  Effectiveness of strategies:  No reported change in medical/health status.    Dimension #3 - Emotional/Behavioral/Cognitive - Risk 2.     Specific goals from treatment plan addressed this week:  Process difficult moods in a healthy way.   Obtain medical insurance coverage.  Effectiveness of strategies:  No medical coverage in place. Mood appears healthy and positive. In a group discussion this week the Pt. Identified that his distorted self image came from being adopted and seeking validation and approval. He was able to continue in the discussion and identify what now promotes a healthy self image in his life.     Dimension #4 - Treatment Acceptance/Resistance - Risk 2.    Specific goals from treatment plan addressed this week:  Meet the expectation of probation to complete treatment.  Effectiveness of strategies:  Pt. Attended group 3x this week. He is in phase two of the program. Pt. Reports continued transportation barriers that cause him to arrive late to group, attending 2  out of 3 hours of group on average. His late arrival often causes him to miss participation in daily check-ins.     Dimension #5 - Relapse Potential - Risk 2.     Specific goals from treatment plan addressed this week:  Process past use patterns to build insight.   Build knowledge and understanding of relapse prevention skills.   Effectiveness of strategies:  Pt. Reports maintaining his sobriety this week. Pt. Reports no experience of urges, triggers, or risk to use this week. Pt. Processed in group his past experiences with random unplanned exposures to meth use of others and was open to healthy feedback on healthy coping and avoidance strategies. Pt. Reports that probation has concern for his UAs that have come back diluted result notes. Pt. Denies having insight to why his labs would be coming back diluted.     Dimension #6 - Recovery Environment - Risk 2.     Specific goals from treatment plan addressed this week:  Strengthen recovery support.   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      T) Treatment plan updated: No.  Patient notified and in agreement: N/A.  Patient educated on: Distorted Self-Image. Patient has completed 54 of 84 program hours at this time. Projected discharge date is: TBD. Current discharge plan is: PENDING.       Staff Name and Title:   KIMBERLY Mason, [facility]  Date: 3/7/2019  Time: 4:40 PM    Psycho-Educational Curriculum  Educational Videos  Date (s) Attended    Nature of Addiction         1/7/19,  1/8/19,     Co-Occurring Disorders          1/14/19,  1/15/19,  1/16/19,  1/17/19,   Distorted Thinking         1/21/19,  1/22/19,  1/23/19,     Community Support           1/28/19,  1/29/19,     Communication Skills          1/4/19,  1/5/19,     Unmanageable Feelings       Happy  2/11/19,  2/12/19,   Relapse Prevention          2/18/19,   Relationships          12/10/18,  12/11/18,  2/26/19,  2/28/19,    Distorted Self Image         12/20/18,  3/4/19,  3/5/19,  3/6/19,   Recovery Maintenance          12/26/18,  12/27/18,  1/2/19,  1/3/19,                                                   Mandatory Education:       HIV/AIDS

## 2021-06-24 NOTE — PROGRESS NOTES
Daily Group Note:    Micheal A Ronquillo attended 2 hours of group therapy today. T) Relapse Prevention.     Total group size of 8.    Pt. Reports maintaining sobriety.  No report of immediate needs.     Staff Name and Title: KIMBERLY Mason, [facility]    Date:  2/18/2019  Time:  9:01 PM

## 2021-06-24 NOTE — PROGRESS NOTES
Daily Group Note:    Micheal ALONSO Ronquillo attended 2 hours of group therapy today. T) Distorted Self-Image.     Total group size of 4.     Staff Name and Title: KIMBERLY Mason, [facility]    Date:  3/5/2019  Time:  8:48 PM

## 2021-06-24 NOTE — PROGRESS NOTES
Weekly Progress Note  Micheal Ronquillo  1985  [id number]      D) Pt. attended 2 groups this week with 0 absences. Pt. attended 0 individual sessions this week. A) Staff facilitated groups and reviewed tx progress. Assessed for [facility]. R) No VAP needed at this time.    Any significant events, defines as events that impact patient s relationship with others inside and outside of treatment: No.   Indicate any changes or monitoring of physical or mental health problems: No.    Indicate involvement by any outside supports: No   IAPP reviewed and modified as needed: N/A       Pt. working on the following dimensions:    Dimension #1 - Withdrawal Potential - Risk 0.     Specific goals from treatment plan addressed this week:  No goals needed, risk level 0.  Effectiveness of strategies:  Last reported use, meth 11/22/18.    Dimension #2 - Biomedical - Risk 0.     Specific goals from treatment plan addressed this week:  No goals needed, risk level 0.  Effectiveness of strategies:  No reported change in medical/health status.    Dimension #3 - Emotional/Behavioral/Cognitive - Risk 2.     Specific goals from treatment plan addressed this week:  Process difficult moods in a healthy way.   Obtain medical insurance coverage.  Effectiveness of strategies:  No medical coverage in place. Mood appears healthy and positive. Pt. Reports a positive mood this week. Pt. Participated in a group discussion on identifying feelings this week.     Dimension #4 - Treatment Acceptance/Resistance - Risk 2.    Specific goals from treatment plan addressed this week:  Meet the expectation of probation to complete treatment.  Effectiveness of strategies:  Pt. Attended group 2x this week, and is now in phase two of the program. Pt. Reports continued transportation barriers that cause him to arrive late to group.     Dimension #5 - Relapse Potential - Risk 2.     Specific goals from treatment plan addressed this week:  Process past use patterns to build  insight.   Build knowledge and understanding of relapse prevention skills.   Effectiveness of strategies:  Pt. Reports maintaining his sobriety this week. Pt. Reports no experience of urges or triggers to use this week.     Dimension #6 - Recovery Environment - Risk 2.     Specific goals from treatment plan addressed this week:  Strengthen recovery support.   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 Pt. Processed his moods in relation to his relationships during a group discussion this week.     T) Treatment plan updated: No.  Patient notified and in agreement: N/A.  Patient educated on: Unmanageable Feelings. Patient has completed 44 of 84 program hours at this time. Projected discharge date is: TBD. Current discharge plan is: PENDING.       Staff Name and Title:   KIMBERLY Mason, [facility]  Date: 2/13/2019  Time: 8:26 PM    Psycho-Educational Curriculum  Educational Videos  Date (s) Attended    Nature of Addiction         1/7/19,  1/8/19,     Co-Occurring Disorders          1/14/19,  1/15/19,  1/16/19,  1/17/19,   Distorted Thinking         1/21/19,  1/22/19,  1/23/19,     Community Support           1/28/19,  1/29/19,     Communication Skills          1/4/19,  1/5/19,     Unmanageable Feelings       Happy  2/11/19,  2/12/19,   Relapse Prevention             Relationships          12/10/18,  12/11/18,     Distorted Self Image         12/20/18,   Recovery Maintenance          12/26/18,  12/27/18,  1/2/19,  1/3/19,                                                   Mandatory Education:       HIV/AIDS

## 2021-06-25 NOTE — TELEPHONE ENCOUNTER
Phone Call:    D) P.O. Sharon Benson called this date and left message re: request for call back. Call back was made this date.    P.O. Reports she spoke with Pt. This date and he has informed her that he is back living at his children's mother's house. P.O. Reports that the Pt. Had also informed her that he had lost his job on 3/7/19. He has not yet informed treatment of his employment status change. Counselor will follow up with Pt.      Staff Name and Title:   KIMBERLY Alegre, [facility]  3/18/2019, 3:08 PM
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
## 2021-06-25 NOTE — TELEPHONE ENCOUNTER
E-mail:    D) [facility] This date to inquire about the process for seeking [facility] for the Pt.    Staff Name and Title:   KIMBERLY Alegre, [facility]  3/14/2019, 2:31 PM

## 2021-06-25 NOTE — PROGRESS NOTES
Daily Group Note:    Micheal Ronquillo attended 2 hours of group therapy today. T) Nature of Addiction.     Total group size of 6.    Staff Name and Title: KIMBERLY Mason, [facility]    Date:  3/20/2019  Time:  8:39 PM

## 2021-06-25 NOTE — PROGRESS NOTES
Weekly Progress Note  Micheal Ronquillo  1985  [id number]      D) Pt. attended 2 groups this week with 0 absences. Pt. attended 0 individual sessions this week. A) Staff facilitated groups and reviewed tx progress. Assessed for [facility]. R) No VAP needed at this time.    Any significant events, defines as events that impact patient s relationship with others inside and outside of treatment: No.   Indicate any changes or monitoring of physical or mental health problems: No.    Indicate involvement by any outside supports: No.   IAPP reviewed and modified as needed: N/A       Pt. working on the following dimensions:    Dimension #1 - Withdrawal Potential - Risk 0.     Specific goals from treatment plan addressed this week:  No goals needed, risk level 0.  Effectiveness of strategies:  Last reported use, meth 11/22/18.    Dimension #2 - Biomedical - Risk 0.     Specific goals from treatment plan addressed this week:  No goals needed, risk level 0.  Effectiveness of strategies:  No reported change in medical/health status.    Dimension #3 - Emotional/Behavioral/Cognitive - Risk 2.     Specific goals from treatment plan addressed this week:  Process difficult moods in a healthy way.   Obtain medical insurance coverage.  Effectiveness of strategies:  No medical coverage in place. Mood appears healthy and positive. Pt.reports mild stress with ability to cope.    Dimension #4 - Treatment Acceptance/Resistance - Risk 2.    Specific goals from treatment plan addressed this week:  Meet the expectation of probation to complete treatment.  Effectiveness of strategies:  Pt. Attended group 2x this week. He is in phase two of the program. Pt. Reports continued transportation barriers that cause him to arrive late to group, attending 2 out of 3 hours of group on average. His late arrival often causes him to miss participation in daily check-ins with group peers.     Dimension #5 - Relapse Potential - Risk 2.     Specific goals from  treatment plan addressed this week:  Process past use patterns to build insight.   Build knowledge and understanding of relapse prevention skills.   Effectiveness of strategies:  Pt. Reports maintaining his sobriety this week. Pt. Reports no experience of urges, triggers, or risk to use this week. Pt. Processed in group the disease concept of addiction this week.    Dimension #6 - Recovery Environment - Risk 2.     Specific goals from treatment plan addressed this week:  Strengthen recovery support.   Effectiveness of strategies:  Pt. 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 He reports minimal involvement with his children, but being back in the home of his children's mother. Pt. Reports he lacks stable housing. Counselor provided Pt. With a [facility] sober housing list and Pt. Reports plans to call the homes to check for availability.     T) Treatment plan updated: No.  Patient notified and in agreement: N/A.  Patient educated on: Nature of Addiction. Patient has completed 62 of 84 program hours at this time. Projected discharge date is: TBD. Current discharge plan is: PENDING.       Staff Name and Title:   KIMBERLY Mason, [facility]  Date: 3/21/2019  Time: 8:22 PM    Psycho-Educational Curriculum  Educational Videos  Date (s) Attended    Nature of Addiction         1/7/19,  1/8/19,  3/18/19,  3/20/19,     Co-Occurring Disorders          1/14/19,  1/15/19,  1/16/19,  1/17/19,   Distorted Thinking         1/21/19,  1/22/19,  1/23/19,     Community Support           1/28/19,  1/29/19,     Communication Skills          1/4/19,  1/5/19,     Unmanageable Feelings       Happy  2/11/19,  2/12/19,   Relapse Prevention          2/18/19,   Relationships          12/10/18,  12/11/18,  2/26/19,  2/28/19,   Distorted Self Image         12/20/18,  3/4/19,  3/5/19,  3/6/19,   Recovery Maintenance          12/26/18,  12/27/18,  1/2/19,  1/3/19,  3/11/19,  3/13/19,                                                    Mandatory Education:       HIV/AIDS

## 2021-06-25 NOTE — PROGRESS NOTES
Daily Group Note:    Michealwaqar Ronquillo attended 2 hours of group therapy today. T)Nature of Addiction.    Total group size of 6.    Pt. Reports maintaining sobriety.  No reported immediate needs.     Staff Name and Title: KIMBERLY Mason, [facility]    Date:  3/18/2019  Time:  8:49 PM

## 2022-06-16 NOTE — PROGRESS NOTES
Daily Group Note:    Micheal CELESTE Ronquillo attended 2 hours of group therapy today. T) Unmanageable Feelings.     Total group size of 6.    Staff Name and Title: KIMBERLY Mason, [facility]    Date:  2/12/2019  Time:  8:53 PM    
Previously Declined (within the last year)